# Patient Record
Sex: FEMALE | Race: WHITE | NOT HISPANIC OR LATINO | Employment: UNEMPLOYED | URBAN - METROPOLITAN AREA
[De-identification: names, ages, dates, MRNs, and addresses within clinical notes are randomized per-mention and may not be internally consistent; named-entity substitution may affect disease eponyms.]

---

## 2021-12-01 ENCOUNTER — OFFICE VISIT (OUTPATIENT)
Dept: ENDOCRINOLOGY | Facility: MEDICAL CENTER | Age: 31
End: 2021-12-01
Attending: NURSE PRACTITIONER
Payer: OTHER GOVERNMENT

## 2021-12-01 VITALS
WEIGHT: 155 LBS | HEART RATE: 95 BPM | SYSTOLIC BLOOD PRESSURE: 120 MMHG | BODY MASS INDEX: 22.96 KG/M2 | DIASTOLIC BLOOD PRESSURE: 72 MMHG | OXYGEN SATURATION: 97 % | HEIGHT: 69 IN

## 2021-12-01 DIAGNOSIS — E55.9 VITAMIN D DEFICIENCY: ICD-10-CM

## 2021-12-01 DIAGNOSIS — C73 PAPILLARY THYROID CARCINOMA (HCC): ICD-10-CM

## 2021-12-01 DIAGNOSIS — E03.9 HYPOTHYROIDISM, UNSPECIFIED TYPE: ICD-10-CM

## 2021-12-01 PROCEDURE — 99204 OFFICE O/P NEW MOD 45 MIN: CPT | Performed by: NURSE PRACTITIONER

## 2021-12-01 PROCEDURE — 99211 OFF/OP EST MAY X REQ PHY/QHP: CPT | Performed by: NURSE PRACTITIONER

## 2021-12-01 RX ORDER — LEVOTHYROXINE SODIUM 0.07 MG/1
75 TABLET ORAL
Qty: 30 TABLET | Refills: 6 | Status: SHIPPED | OUTPATIENT
Start: 2021-12-01 | End: 2022-04-05

## 2021-12-01 RX ORDER — LEVOTHYROXINE SODIUM 0.07 MG/1
75 TABLET ORAL
COMMUNITY
Start: 2021-10-07 | End: 2021-12-01 | Stop reason: SDUPTHER

## 2021-12-01 NOTE — PROGRESS NOTES
REASON FOR CONSULTATION:  Consult requested by Vincent Painting M.D. for evaluation of postsurgical hypothyroidism.    HPI:     Lina Poon is a 31 y.o. female, who had partial thyroidectomy; L thyroid lobectomy and isthmusectomy on 01/22/2021 with pathology revealing papillary thyroid cancer.  Patient was in 2nd trimester when she was given this diagnosis. She did not have problems with postoperative hypocalcemia. She wasn't subsequently treated with radioactive iodine . Thyrogen stimulation studies were last done on April 2021.  With a stimulated thyroglobulin 2.2 and whole body scan negative.  Neck ultrasound was last done on 06/2021 showing no evidence of recurrence.    FNA biopsy that was completed prior to surgery 11/17/2020: Highly suspicious for primary thyroid neoplasm; 2.9 cm nodule cluster in the left lower pole of thyroid gland.    Surgical pathology completed 01/22/2021: Lymph nodes negative for tumor, tumor size 3.2 cm, papillary carcinoma-conventional and follicular variant.  Extrathyroidal extension was absent.  Invasion of strap muscles was absent.  Invasion of subcu soft tissue, larynx, trachea, esophagus or recurrent laryngeal nerve was absent.  Angioinvasion absent.  Lymphatic invasion not identified.  Margins uninvolved.  Number of lymph nodes identified 10 and all were without metastasis.    Pathology showed low risk papillary thyroid carcinoma.  No extension to other tissues and radiation was not indicated at the time.  Also patient was pregnant during all of the invasive procedures and testing that occurred from October 2020 forward.    Most recent thyroid function completed 09/02/2021: TSH <0.01, free T4 1.25.    Levothyroxine 75mcg for over 8 months. Takes hormone 1 hours before breakfast. Only taking prenatal vitamin at this time.     Moved from [facility] in June 2021. [facility], Dr. Emely Dunham.     Follow-up neck ultrasound completed 06/22/2021: Right  thyroid lobe 1.8 x 1.7 x 4.9 cm, volume 8.2 cc.  Normal in size.  Mildly heterogeneous echotexture without hypervascularity.  No right lobe nodules.  Left lobe is surgically absent.  Isthmus is surgically absent.    She denies hoarseness or voice changes.  She denies any known neck nodules. She denies shortness of breath or cough.  She denies history of head or neck radiation other than radioactive iodine therapy following thyroidectomy.  She denies perioral or peripheral paresthesias.   Patient denies family history of thyroid disease or thyroid cancer..     Since last visit, she has remained on 75 mcg micrograms daily, which has been her dose for 4 months.  She is feeling well.  Energy level has been adequate.  Weight is stable, with weight loss directly related to postpartum status..  No palpitations, no frequent diarrhea, or frequent constipation.  No heat or cold intolerance.  No anterior neck symptoms or problems.     Patient reports adequate energy level, denies palpitation, denies dysphagia, denies anterior neck tenderness.     I have reviewed approximately 20 pages of records pertaining to her prior thyroid cancer evaluation and treatment.      ROS:      CONS:     No fever, no chills, no weight loss, no fatigue   EYES:      No diplopia, no blurry vision, no redness of eyes, no swelling of eyelids   ENT:    No hearing loss, No ear pain, No sore throat, no dysphagia, no neck swelling   CV:     No chest pain, no palpitations, no claudication, no orthopnea, no PND   PULM:    No SOB, no cough, no hemoptysis, no wheezing    GI:   No nausea, no vomiting, no diarrhea, no constipation, no bloody stools   :  Passing urine well, no dysuria, no hematuria   ENDO:   No polyuria, no polydipsia, no heat intolerance, no cold intolerance   NEURO: No headaches, no dizziness, no convulsions, no tremors   MUSC:  No joint swellings, no arthralgias, no myalgias, no weakness   SKIN:   No rash, no ulcers, no dry skin   PSYCH:    No depression, no anxiety, no difficulty sleeping       Past Medical History:  There are no problems to display for this patient.      Past Surgical History:  No past surgical history on file.     Allergies:  Patient has no known allergies.     Current Medications:    Current Outpatient Medications:   •  levothyroxine (SYNTHROID) 75 MCG Tab, Take 75 mcg by mouth every morning before breakfast., Disp: , Rfl:     Social History:  Social History     Socioeconomic History   • Marital status:      Spouse name: Not on file   • Number of children: Not on file   • Years of education: Not on file   • Highest education level: Not on file   Occupational History   • Not on file   Tobacco Use   • Smoking status: Never Smoker   • Smokeless tobacco: Never Used   Vaping Use   • Vaping Use: Never used   Substance and Sexual Activity   • Alcohol use: Not Currently   • Drug use: Never   • Sexual activity: Not on file   Other Topics Concern   • Not on file   Social History Narrative   • Not on file     Social Determinants of Health     Financial Resource Strain:    • Difficulty of Paying Living Expenses: Not on file   Food Insecurity:    • Worried About Running Out of Food in the Last Year: Not on file   • Ran Out of Food in the Last Year: Not on file   Transportation Needs:    • Lack of Transportation (Medical): Not on file   • Lack of Transportation (Non-Medical): Not on file   Physical Activity:    • Days of Exercise per Week: Not on file   • Minutes of Exercise per Session: Not on file   Stress:    • Feeling of Stress : Not on file   Social Connections:    • Frequency of Communication with Friends and Family: Not on file   • Frequency of Social Gatherings with Friends and Family: Not on file   • Attends Jainism Services: Not on file   • Active Member of Clubs or Organizations: Not on file   • Attends Club or Organization Meetings: Not on file   • Marital Status: Not on file   Intimate Partner Violence:    • Fear of Current  "or Ex-Partner: Not on file   • Emotionally Abused: Not on file   • Physically Abused: Not on file   • Sexually Abused: Not on file   Housing Stability:    • Unable to Pay for Housing in the Last Year: Not on file   • Number of Places Lived in the Last Year: Not on file   • Unstable Housing in the Last Year: Not on file        Family History:   No family history on file.      PHYSICAL EXAM:   Vital signs: /72 (BP Location: Left arm, Patient Position: Sitting, BP Cuff Size: Adult)   Pulse 95   Ht 1.753 m (5' 9\")   Wt 70.3 kg (155 lb)   SpO2 97%   BMI 22.89 kg/m²   GENERAL: Well-developed, well-nourished  in no apparent distress.   EYE: No ocular and eyelid asymmetry, Anicteric sclerae,  PERRL, No exophthalmos or lidlag  HENT: Hearing grossly intact, Normocephalic, atraumatic. Pink, moist mucous membranes, No exudate  NECK: Supple. Trachea midline.  Well-healed 2 cm mid clavicular incision.  CARDIOVASCULAR: Regular rate and rhythm. No murmurs, rubs, or gallops.   LUNGS: Clear to auscultation bilaterally   ABDOMEN: Soft, nontender with positive bowel sounds.   EXTREMITIES: No clubbing, cyanosis, or edema.   NEUROLOGICAL: Cranial nerves II-XII are grossly intact   Symmetric reflexes at the patella no proximal muscle weakness, No visible tremor with both outstretched hands  LYMPH: No cervical, supraclavicular,  adenopathy palpated.   SKIN: No rashes, lesions. Turgor is normal.    ASSESSMENT/PLAN:     1. Papillary thyroid carcinoma (HCC)  Stable.  Stimulated thyroglobulin AB at 2.2 which is within normal limits secondary to the remaining right thyroid lobe being present but the patient TSH is appropriately suppressed following the left thyroidectomy.  Recommend current thyroid function with thyroglobulin.  Ultrasound of neck was already completed postoperatively.  Recommend repeat of ultrasound in 1 year.  Patient recovered well post surgery, calcium is within normal limits and patient denied any postoperative " paresthesia or vocal cord disruption.    2. Hypothyroidism, unspecified type  Stable.  Recommend current thyroid function level to further assess upward titration of current levothyroxine dosing.    Complete labs now:  - T3 FREE; Future  - FREE THYROXINE; Future  - TSH; Future  - THYROGLOBULIN, QT; Future  - THYROID PEROXIDASE  (TPO) AB; Future  - VITAMIN D,25 HYDROXY; Future  - VITAMIN B12; Future  - Comp Metabolic Panel; Future      3. Vitamin D deficiency  Unstable.  Recommend vitamin D 5000 IU daily.    Future lab orders:  - VITAMIN D,25 HYDROXY; Future  - VITAMIN B12; Future  - Comp Metabolic Panel; Future    Complete labs 1 to 2 weeks prior to next appointment  Next appointment in 3 months.    Thank you kindly for allowing me to participate in the thyroid care plan for this patient.    Sabi Pérez, APRN  12/01/21    CC:   Vincent Painting M.D.

## 2021-12-11 ENCOUNTER — HOSPITAL ENCOUNTER (OUTPATIENT)
Dept: LAB | Facility: MEDICAL CENTER | Age: 31
End: 2021-12-11
Attending: NURSE PRACTITIONER
Payer: OTHER GOVERNMENT

## 2021-12-11 DIAGNOSIS — E55.9 VITAMIN D DEFICIENCY: ICD-10-CM

## 2021-12-11 DIAGNOSIS — E03.9 HYPOTHYROIDISM, UNSPECIFIED TYPE: ICD-10-CM

## 2021-12-11 LAB
ALBUMIN SERPL BCP-MCNC: 5 G/DL (ref 3.2–4.9)
ALBUMIN/GLOB SERPL: 1.7 G/DL
ALP SERPL-CCNC: 90 U/L (ref 30–99)
ALT SERPL-CCNC: 9 U/L (ref 2–50)
ANION GAP SERPL CALC-SCNC: 10 MMOL/L (ref 7–16)
AST SERPL-CCNC: 14 U/L (ref 12–45)
BILIRUB SERPL-MCNC: 1.6 MG/DL (ref 0.1–1.5)
BUN SERPL-MCNC: 11 MG/DL (ref 8–22)
CALCIUM SERPL-MCNC: 9.9 MG/DL (ref 8.5–10.5)
CHLORIDE SERPL-SCNC: 104 MMOL/L (ref 96–112)
CO2 SERPL-SCNC: 25 MMOL/L (ref 20–33)
CREAT SERPL-MCNC: 0.8 MG/DL (ref 0.5–1.4)
FASTING STATUS PATIENT QL REPORTED: NORMAL
GLOBULIN SER CALC-MCNC: 2.9 G/DL (ref 1.9–3.5)
GLUCOSE SERPL-MCNC: 86 MG/DL (ref 65–99)
POTASSIUM SERPL-SCNC: 4.2 MMOL/L (ref 3.6–5.5)
PROT SERPL-MCNC: 7.9 G/DL (ref 6–8.2)
SODIUM SERPL-SCNC: 139 MMOL/L (ref 135–145)
T3FREE SERPL-MCNC: 2.31 PG/ML (ref 2–4.4)
T4 FREE SERPL-MCNC: 1.38 NG/DL (ref 0.93–1.7)
THYROPEROXIDASE AB SERPL-ACNC: >600 IU/ML (ref 0–9)
TSH SERPL DL<=0.005 MIU/L-ACNC: 12.7 UIU/ML (ref 0.38–5.33)
VIT B12 SERPL-MCNC: 609 PG/ML (ref 211–911)

## 2021-12-11 PROCEDURE — 84443 ASSAY THYROID STIM HORMONE: CPT

## 2021-12-11 PROCEDURE — 86800 THYROGLOBULIN ANTIBODY: CPT

## 2021-12-11 PROCEDURE — 82607 VITAMIN B-12: CPT

## 2021-12-11 PROCEDURE — 86376 MICROSOMAL ANTIBODY EACH: CPT

## 2021-12-11 PROCEDURE — 82306 VITAMIN D 25 HYDROXY: CPT

## 2021-12-11 PROCEDURE — 84481 FREE ASSAY (FT-3): CPT

## 2021-12-11 PROCEDURE — 84439 ASSAY OF FREE THYROXINE: CPT

## 2021-12-11 PROCEDURE — 84432 ASSAY OF THYROGLOBULIN: CPT

## 2021-12-11 PROCEDURE — 80053 COMPREHEN METABOLIC PANEL: CPT

## 2021-12-11 PROCEDURE — 36415 COLL VENOUS BLD VENIPUNCTURE: CPT

## 2021-12-13 DIAGNOSIS — E03.9 HYPOTHYROIDISM, UNSPECIFIED TYPE: ICD-10-CM

## 2021-12-13 RX ORDER — LEVOTHYROXINE SODIUM 0.1 MG/1
100 TABLET ORAL
Qty: 90 TABLET | Refills: 2 | Status: SHIPPED | OUTPATIENT
Start: 2021-12-13 | End: 2022-08-08

## 2021-12-15 LAB — 25(OH)D3 SERPL-MCNC: 41 NG/ML (ref 30–80)

## 2021-12-20 LAB
THYROGLOB AB SERPL-ACNC: 5.9 IU/ML (ref 0–4)
THYROGLOB SERPL-MCNC: 1.8 NG/ML (ref 1.3–31.8)
THYROGLOB SERPL-MCNC: ABNORMAL NG/ML (ref 1.3–31.8)

## 2022-03-29 ENCOUNTER — HOSPITAL ENCOUNTER (OUTPATIENT)
Dept: LAB | Facility: MEDICAL CENTER | Age: 32
End: 2022-03-29
Attending: NURSE PRACTITIONER
Payer: OTHER GOVERNMENT

## 2022-03-29 DIAGNOSIS — E03.9 HYPOTHYROIDISM, UNSPECIFIED TYPE: ICD-10-CM

## 2022-03-29 PROCEDURE — 84481 FREE ASSAY (FT-3): CPT

## 2022-03-29 PROCEDURE — 36415 COLL VENOUS BLD VENIPUNCTURE: CPT

## 2022-03-29 PROCEDURE — 84443 ASSAY THYROID STIM HORMONE: CPT

## 2022-03-29 PROCEDURE — 84439 ASSAY OF FREE THYROXINE: CPT

## 2022-03-30 LAB
T3FREE SERPL-MCNC: 2.96 PG/ML (ref 2–4.4)
T4 FREE SERPL-MCNC: 1.72 NG/DL (ref 0.93–1.7)
TSH SERPL DL<=0.005 MIU/L-ACNC: 10.4 UIU/ML (ref 0.38–5.33)

## 2022-04-05 ENCOUNTER — OFFICE VISIT (OUTPATIENT)
Dept: ENDOCRINOLOGY | Facility: MEDICAL CENTER | Age: 32
End: 2022-04-05
Attending: NURSE PRACTITIONER
Payer: OTHER GOVERNMENT

## 2022-04-05 VITALS
SYSTOLIC BLOOD PRESSURE: 110 MMHG | DIASTOLIC BLOOD PRESSURE: 72 MMHG | BODY MASS INDEX: 22.51 KG/M2 | WEIGHT: 152 LBS | HEIGHT: 69 IN | OXYGEN SATURATION: 97 % | HEART RATE: 74 BPM

## 2022-04-05 DIAGNOSIS — C73 PAPILLARY THYROID CARCINOMA (HCC): ICD-10-CM

## 2022-04-05 DIAGNOSIS — E89.0 POSTOPERATIVE HYPOTHYROIDISM: ICD-10-CM

## 2022-04-05 DIAGNOSIS — E06.3 HASHIMOTO'S DISEASE: ICD-10-CM

## 2022-04-05 DIAGNOSIS — E55.9 VITAMIN D DEFICIENCY: ICD-10-CM

## 2022-04-05 PROCEDURE — 99211 OFF/OP EST MAY X REQ PHY/QHP: CPT | Performed by: NURSE PRACTITIONER

## 2022-04-05 PROCEDURE — 99214 OFFICE O/P EST MOD 30 MIN: CPT | Performed by: NURSE PRACTITIONER

## 2022-04-05 RX ORDER — LEVOTHYROXINE SODIUM 0.15 MG/1
150 TABLET ORAL
Qty: 90 TABLET | Refills: 3 | Status: SHIPPED | OUTPATIENT
Start: 2022-04-05 | End: 2022-08-08

## 2022-04-05 NOTE — PROGRESS NOTES
REASON FOR CONSULTATION:  Follow up for evaluation of postsurgical hypothyroidism.    HPI:     Lina Poon is a 31 y.o. female, who had partial thyroidectomy; L thyroid lobectomy and isthmusectomy on 01/22/2021 with pathology revealing papillary thyroid cancer.  Patient was in 2nd trimester of pregnancy when she was given this diagnosis. She did not have problems with postoperative hypocalcemia. She wasn't subsequently treated with radioactive iodine. Thyrogen stimulation studies were last done on April 2021.  With a stimulated thyroglobulin 2.2 and whole body scan negative.  Neck ultrasound was last done on 06/2021 showing no evidence of recurrence.    Moved from [facility] in June 2021. [facility], Dr. Emely Dunham with her treating physician.    FNA biopsy that was completed prior to surgery 11/17/2020: Highly suspicious for primary thyroid neoplasm; 2.9 cm nodule cluster in the left lower pole of thyroid gland.    Surgical pathology completed 01/22/2021: Lymph nodes negative for tumor, tumor size 3.2 cm, papillary carcinoma-conventional and follicular variant.  Extrathyroidal extension was absent.  Invasion of strap muscles was absent.  Invasion of subcu soft tissue, larynx, trachea, esophagus or recurrent laryngeal nerve was absent.  Angioinvasion absent.  Lymphatic invasion not identified.  Margins uninvolved.  Number of lymph nodes identified 10 and all were without metastasis.    Pathology showed low risk papillary thyroid carcinoma.  No extension to other tissues and radiation was not indicated at the time.  Also patient was pregnant during all of the invasive procedures and testing that occurred from October 2020 forward.    Most recent thyroid function completed 09/02/2021: TSH <0.01, free T4 1.25.    After initial consultation levothyroxine was increased to 100 mcg daily.  Patient has been taking the medication routinely on an empty stomach 30 to 40 minutes prior to  breakfast every morning without issue.  Patient does report fatigue however she is caring full-time for her infant daughter.  Patient denies cold intolerance or constipation.  Patient denies taking calcium, antacids and/or iron supplementation.  Only supplementation she is currently taking is a prenatal vitamin.    Follow-up neck ultrasound completed 06/22/2021: Right thyroid lobe 1.8 x 1.7 x 4.9 cm, volume 8.2 cc.  Normal in size.  Mildly heterogeneous echotexture without hypervascularity.  No right lobe nodules.  Left lobe is surgically absent.  Isthmus is surgically absent.    She denies hoarseness or voice changes.  She denies any known neck nodules. She denies shortness of breath or cough.  She denies history of head or neck radiation other than radioactive iodine therapy following thyroidectomy.  She denies perioral or peripheral paresthesias.       Weight is unchanged from last appointment.    Unfortunately TSH level remains elevated at 10.4 and free T4 slightly elevated although this is not an accurate measure.    TPO antibodies are positive.     Ref. Range 12/11/2021 07:30   Microsomal -Tpo- Abs Latest Ref Range: 0.0 - 9.0 IU/mL >600.0 (H)        Ref. Range 3/29/2022 07:31   TSH Latest Ref Range: 0.380 - 5.330 uIU/mL 10.400 (H)   Free T-4 Latest Ref Range: 0.93 - 1.70 ng/dL 1.72 (H)   T3,Free Latest Ref Range: 2.00 - 4.40 pg/mL 2.96       Patient reports adequate energy level, denies palpitation, denies dysphagia, denies anterior neck tenderness.           ROS:      CONS:     No fever, no chills, no weight loss, no fatigue   EYES:      No diplopia, no blurry vision, no redness of eyes, no swelling of eyelids   ENT:    No hearing loss, No ear pain, No sore throat, no dysphagia, no neck swelling   CV:     No chest pain, no palpitations, no claudication, no orthopnea, no PND   PULM:    No SOB, no cough, no hemoptysis, no wheezing    GI:   No nausea, no vomiting, no diarrhea, no constipation, no bloody stools    :  Passing urine well, no dysuria, no hematuria   ENDO:   No polyuria, no polydipsia, no heat intolerance, no cold intolerance   NEURO: No headaches, no dizziness, no convulsions, no tremors   MUSC:  No joint swellings, no arthralgias, no myalgias, no weakness   SKIN:   No rash, no ulcers, no dry skin   PSYCH:   No depression, no anxiety, no difficulty sleeping       Past Medical History:  There are no problems to display for this patient.      Past Surgical History:  History reviewed. No pertinent surgical history.     Allergies:  Patient has no known allergies.     Current Medications:    Current Outpatient Medications:   •  levothyroxine (SYNTHROID) 100 MCG Tab, Take 1 Tablet by mouth every morning on an empty stomach., Disp: 90 Tablet, Rfl: 2  •  levothyroxine (SYNTHROID) 75 MCG Tab, Take 1 Tablet by mouth every morning before breakfast., Disp: 30 Tablet, Rfl: 6    Social History:  Social History     Socioeconomic History   • Marital status:      Spouse name: Not on file   • Number of children: Not on file   • Years of education: Not on file   • Highest education level: Not on file   Occupational History   • Not on file   Tobacco Use   • Smoking status: Never Smoker   • Smokeless tobacco: Never Used   Vaping Use   • Vaping Use: Never used   Substance and Sexual Activity   • Alcohol use: Not Currently   • Drug use: Never   • Sexual activity: Not on file   Other Topics Concern   • Not on file   Social History Narrative   • Not on file     Social Determinants of Health     Financial Resource Strain: Not on file   Food Insecurity: Not on file   Transportation Needs: Not on file   Physical Activity: Not on file   Stress: Not on file   Social Connections: Not on file   Intimate Partner Violence: Not on file   Housing Stability: Not on file        Family History:   History reviewed. No pertinent family history.      PHYSICAL EXAM:   Vital signs: /72 (BP Location: Left arm, Patient Position: Sitting, BP  "Cuff Size: Adult)   Pulse 74   Ht 1.753 m (5' 9\")   Wt 68.9 kg (152 lb)   SpO2 97%   BMI 22.45 kg/m²   GENERAL: Well-developed, well-nourished  in no apparent distress.   EYE: No ocular and eyelid asymmetry, Anicteric sclerae,  PERRL, No exophthalmos or lidlag  HENT: Hearing grossly intact, Normocephalic, atraumatic. Pink, moist mucous membranes, No exudate  NECK: Supple. Trachea midline.  Well-healed 2 cm mid clavicular incision.  CARDIOVASCULAR: Regular rate and rhythm. No murmurs, rubs, or gallops.   LUNGS: Clear to auscultation bilaterally   ABDOMEN: Soft, nontender with positive bowel sounds.   EXTREMITIES: No clubbing, cyanosis, or edema.   NEUROLOGICAL: Cranial nerves II-XII are grossly intact   Symmetric reflexes at the patella no proximal muscle weakness, No visible tremor with both outstretched hands  LYMPH: No cervical, supraclavicular,  adenopathy palpated.   SKIN: No rashes, lesions. Turgor is normal.    ASSESSMENT/PLAN:     1. Papillary thyroid carcinoma (HCC)  Stable.  Stimulated thyroglobulin AB at 2.2 which is within normal limits secondary to the remaining right thyroid lobe being present but the patient TSH is appropriately suppressed following the left thyroidectomy.  Recommend current thyroid function with thyroglobulin.  Ultrasound of neck was already completed postoperatively.  Recommend repeat of ultrasound  next 5 months.      2. Hypothyroidism, unspecified type  Unstable.  Recommend increasing levothyroxine to 150 mcg daily.    Repeat labs in 6 weeks and notify provider when this has been completed.        3.  Hashimoto's disease  Another etiology of hypothyroid related condition.  TPO antibodies greater than 600 at this time.  Does not change course of treatment.      4. Vitamin D deficiency  Unstable.  Recommend vitamin D 3000 to 4000IU daily.      Complete labs in 6 weeks notify provider when this is completed.    Next appointment in 3 months with Dr German.     Thank you kindly " for allowing me to participate in the thyroid care plan for this patient.    Sabi Pérez, APRN  04/05/2022    CC:   Vincent Painting M.D.

## 2022-04-29 ENCOUNTER — HOSPITAL ENCOUNTER (OUTPATIENT)
Dept: LAB | Facility: MEDICAL CENTER | Age: 32
End: 2022-04-29
Attending: NURSE PRACTITIONER
Payer: OTHER GOVERNMENT

## 2022-04-29 DIAGNOSIS — E89.0 POSTOPERATIVE HYPOTHYROIDISM: ICD-10-CM

## 2022-04-29 LAB
T3FREE SERPL-MCNC: 3.34 PG/ML (ref 2–4.4)
T4 FREE SERPL-MCNC: 2.25 NG/DL (ref 0.93–1.7)
TSH SERPL DL<=0.005 MIU/L-ACNC: 0.22 UIU/ML (ref 0.38–5.33)

## 2022-04-29 PROCEDURE — 84481 FREE ASSAY (FT-3): CPT

## 2022-04-29 PROCEDURE — 86800 THYROGLOBULIN ANTIBODY: CPT

## 2022-04-29 PROCEDURE — 36415 COLL VENOUS BLD VENIPUNCTURE: CPT

## 2022-04-29 PROCEDURE — 84439 ASSAY OF FREE THYROXINE: CPT

## 2022-04-29 PROCEDURE — 84432 ASSAY OF THYROGLOBULIN: CPT

## 2022-04-29 PROCEDURE — 84443 ASSAY THYROID STIM HORMONE: CPT

## 2022-05-02 LAB
THYROGLOB AB SERPL-ACNC: 1.7 IU/ML (ref 0–4)
THYROGLOB SERPL-MCNC: 0.8 NG/ML (ref 1.3–31.8)
THYROGLOB SERPL-MCNC: ABNORMAL NG/ML (ref 1.3–31.8)

## 2022-05-09 DIAGNOSIS — C73 PAPILLARY THYROID CARCINOMA (HCC): ICD-10-CM

## 2022-05-09 DIAGNOSIS — E89.0 POSTOPERATIVE HYPOTHYROIDISM: ICD-10-CM

## 2022-06-07 ENCOUNTER — HOSPITAL ENCOUNTER (OUTPATIENT)
Dept: RADIOLOGY | Facility: MEDICAL CENTER | Age: 32
End: 2022-06-07
Attending: NURSE PRACTITIONER
Payer: OTHER GOVERNMENT

## 2022-06-07 DIAGNOSIS — C73 PAPILLARY THYROID CARCINOMA (HCC): ICD-10-CM

## 2022-06-07 PROCEDURE — 76536 US EXAM OF HEAD AND NECK: CPT

## 2022-08-08 ENCOUNTER — OFFICE VISIT (OUTPATIENT)
Dept: ENDOCRINOLOGY | Facility: MEDICAL CENTER | Age: 32
End: 2022-08-08
Attending: INTERNAL MEDICINE
Payer: OTHER GOVERNMENT

## 2022-08-08 VITALS
SYSTOLIC BLOOD PRESSURE: 118 MMHG | HEIGHT: 69 IN | WEIGHT: 154 LBS | OXYGEN SATURATION: 96 % | HEART RATE: 94 BPM | DIASTOLIC BLOOD PRESSURE: 80 MMHG | BODY MASS INDEX: 22.81 KG/M2

## 2022-08-08 DIAGNOSIS — E55.9 VITAMIN D DEFICIENCY: ICD-10-CM

## 2022-08-08 DIAGNOSIS — E06.3 HASHIMOTO'S DISEASE: ICD-10-CM

## 2022-08-08 DIAGNOSIS — E89.0 POSTOPERATIVE HYPOTHYROIDISM: ICD-10-CM

## 2022-08-08 DIAGNOSIS — C73 PAPILLARY THYROID CARCINOMA (HCC): ICD-10-CM

## 2022-08-08 PROCEDURE — 99211 OFF/OP EST MAY X REQ PHY/QHP: CPT | Performed by: INTERNAL MEDICINE

## 2022-08-08 PROCEDURE — 99214 OFFICE O/P EST MOD 30 MIN: CPT | Performed by: INTERNAL MEDICINE

## 2022-08-08 RX ORDER — LEVOTHYROXINE SODIUM 137 UG/1
137 TABLET ORAL
Qty: 90 TABLET | Refills: 2 | Status: SHIPPED | OUTPATIENT
Start: 2022-08-08 | End: 2023-02-22

## 2022-08-08 ASSESSMENT — PATIENT HEALTH QUESTIONNAIRE - PHQ9: CLINICAL INTERPRETATION OF PHQ2 SCORE: 0

## 2022-08-08 NOTE — PROGRESS NOTES
CHIEF COMPLAINT: Followup of postsurgical hypothyroidism and papillary thyroid carcinoma.    HPI:   Lina Poon is a 31 y.o. female, who had an initial left hemithyroidectomy with isthmusectomy on January 22, 2021 in [place] with pathology revealing papillary thyroid cancer.  She was diagnosed with a suspicious thyroid nodule on the left lobe while the second trimester pregnancy.  Pathology revealed 3.2 cm classic type and follicular variant of papillary thyroid carcinoma with no extrathyroidal extension and no lymphatic or vascular invasion.  10 out of 10 lymph nodes from the central neck compartment showed no evidence of metastasis.  Because her thyroid cancer was low risk he was not treated with radioactive iodine.  Incidentally she does have Hashimoto's thyroiditis and her TPO antibodies are over 600 at baseline.    She was previously seen by the nurse practitioner and this is my first time meeting her.    Her last unstimulated quantitative thyroglobulin was 0.8 with negative interfering antibodies on April 29, 2022    Her last neck ultrasound on July 7, 2022 showed no evidence of recurrence with a heterogenous right lobe with no focal or suspicious thyroid nodules.      Currently she remains on levothyroxine 150 MCG daily which has been her dose since March 2022 after discovering that her TSH was elevated at 10 while on levothyroxine 100 MCG daily.  She reports good compliance and denies missing any daily doses.  She does report occasional anxiety and occasional tremors of this particular dose.  She denies heat intolerance or palpitations.      Her last TSH was suppressed at 0.22 with a frankly elevated free T4 of 2.25 on April 29, 2022      Patient's medications, allergies, and social histories were reviewed and updated as appropriate.      ROS:      CONS:     No fever, no chills   EYES:     No diplopia, no blurry vision   CV:           No chest pain, no palpitations   PULM:     No SOB, no cough,  "no hemoptysis.   GI:            No nausea, no vomiting, no diarrhea, no constipation   ENDO:     No polyuria, no polydipsia, no heat intolerance, no cold intolerance       Past Medical History:  Problem List:  2022-08: Papillary thyroid carcinoma (HCC)  2022-08: Postoperative hypothyroidism  2022-08: Vitamin D deficiency      Past Surgical History:  No past surgical history on file.     Allergies:  Patient has no known allergies.     Social History:  Social History     Tobacco Use   • Smoking status: Never Smoker   • Smokeless tobacco: Never Used   Vaping Use   • Vaping Use: Never used   Substance Use Topics   • Alcohol use: Yes     Comment: occasional   • Drug use: Never        Family History:   family history is not on file.      PHYSICAL EXAM:   Vital signs: /80 (BP Location: Left arm, Patient Position: Sitting, BP Cuff Size: Adult)   Pulse 94   Ht 1.753 m (5' 9\")   Wt 69.9 kg (154 lb)   SpO2 96%   Breastfeeding No   BMI 22.74 kg/m²   GENERAL: Well-developed, well-nourished in no apparent distress.   EYE:  No ocular asymmetry, PERRLA  HENT: Pink, moist mucous membranes.    NECK: Well healed transverse scar, Thyroid is surgically absent   CARDIOVASCULAR:  No murmurs  LUNGS: Clear breath sounds  ABDOMEN: Soft, nontender   EXTREMITIES: No clubbing, cyanosis, or edema.   NEUROLOGICAL: No gross focal motor abnormalities   LYMPH: No cervical adenopathy palpated.   SKIN: No rashes, lesions.       Labs:  No results found for: WBC, RBC, HEMOGLOBIN, MCV, MCH, MCHC, RDW, MPV    Lab Results   Component Value Date/Time    SODIUM 139 12/11/2021 07:30 AM    POTASSIUM 4.2 12/11/2021 07:30 AM    CHLORIDE 104 12/11/2021 07:30 AM    CO2 25 12/11/2021 07:30 AM    ANION 10.0 12/11/2021 07:30 AM    GLUCOSE 86 12/11/2021 07:30 AM    BUN 11 12/11/2021 07:30 AM    CREATININE 0.80 12/11/2021 07:30 AM    CALCIUM 9.9 12/11/2021 07:30 AM    ASTSGOT 14 12/11/2021 07:30 AM    ALTSGPT 9 12/11/2021 07:30 AM    TBILIRUBIN 1.6 (H) " 12/11/2021 07:30 AM    ALBUMIN 5.0 (H) 12/11/2021 07:30 AM    TOTPROTEIN 7.9 12/11/2021 07:30 AM    GLOBULIN 2.9 12/11/2021 07:30 AM    AGRATIO 1.7 12/11/2021 07:30 AM       Lab Results   Component Value Date/Time    TSHULTRASEN 0.220 (L) 04/29/2022 1343     Lab Results   Component Value Date/Time    FREET4 2.25 (H) 04/29/2022 1343     Lab Results   Component Value Date/Time    FREET3 3.34 04/29/2022 1343     No results found for: THYSTIMIG      Imaging:  US-THYROID  Narrative: 6/7/2022 8:29 AM    HISTORY/REASON FOR EXAM:  Papillary CA status post left hemithyroidectomy    TECHNIQUE/EXAM DESCRIPTION:  Ultrasound of the soft tissues of the head and neck.    COMPARISON:  None    FINDINGS:  The thyroid gland is heterogeneous.  Vascularity is normal.    The right lobe of the thyroid gland measures 1.59 cm x 4.98 cm x 1.70 cm. The contour is normal. No focal mass lesions are identified. The right lobe is heterogeneous in echotexture.    The left lower lobe of the thyroid gland is surgically absent. No evidence of focal recurrence noted No focal mass lesions are identified.    The isthmus measures 0.43 cm.    Lymph nodes are noted bilaterally with no evidence of hilar effacement asymmetric cortical thickening and cystic artery calcifications noted.  Impression: Personal history of papillary CA left thyroid status post left thyroid lobectomy.  The thyroid is very heterogeneous in echotexture which may represent changes of Hashimoto's thyroiditis. No abnormal lymphadenopathy or evidence for recurrence        ASSESSMENT/PLAN:     1. Papillary thyroid carcinoma (HCC)  Stable  No evidence of recurrence  Continue monitoring quantitative thyroglobulin every 6 to 12 months  Continue monitoring neck ultrasound every 12 months    2. Postoperative hypothyroidism  Unstable  TSH is low free T4 is elevated  Adjust levothyroxine to 137 MCG daily  Repeat thyroid labs again in 3 months  I will contact her  Follow-up in 6 months with  repeat thyroid labs    3. Vitamin D deficiency  Stable  Continue monitor    4. Hashimoto's disease  This is a symptom background of her thyroid cancer  Her right lobe that is intact shows heterogenous changes compatible with autoimmune thyroid disease or Hashimoto's  Recommend observation as she is already receiving appropriate treatment without her replacement therapy      Return in about 6 months (around 2/8/2023).      This patient during there office visit today was started on a new medication.  Side effects of the new medication were discussed with the patient today in the office.     Thank you kindly for allowing me to participate in the thyroid care plan for this patient.    Chacorta German MD, [facility], [facility]  08/08/22    CC:   Vincent Painting M.D.

## 2022-12-15 ENCOUNTER — OFFICE VISIT (OUTPATIENT)
Dept: URGENT CARE | Facility: PHYSICIAN GROUP | Age: 32
End: 2022-12-15
Payer: OTHER GOVERNMENT

## 2022-12-15 ENCOUNTER — HOSPITAL ENCOUNTER (OUTPATIENT)
Facility: MEDICAL CENTER | Age: 32
End: 2022-12-15
Attending: STUDENT IN AN ORGANIZED HEALTH CARE EDUCATION/TRAINING PROGRAM
Payer: OTHER GOVERNMENT

## 2022-12-15 VITALS
DIASTOLIC BLOOD PRESSURE: 64 MMHG | WEIGHT: 162 LBS | TEMPERATURE: 97.3 F | HEIGHT: 69 IN | RESPIRATION RATE: 18 BRPM | OXYGEN SATURATION: 100 % | HEART RATE: 75 BPM | BODY MASS INDEX: 23.99 KG/M2 | SYSTOLIC BLOOD PRESSURE: 136 MMHG

## 2022-12-15 DIAGNOSIS — J10.1 INFLUENZA A: ICD-10-CM

## 2022-12-15 PROCEDURE — 99213 OFFICE O/P EST LOW 20 MIN: CPT | Performed by: STUDENT IN AN ORGANIZED HEALTH CARE EDUCATION/TRAINING PROGRAM

## 2022-12-15 PROCEDURE — 0240U HCHG SARS-COV-2 COVID-19 NFCT DS RESP RNA 3 TRGT MIC: CPT

## 2022-12-15 RX ORDER — OSELTAMIVIR PHOSPHATE 75 MG/1
75 CAPSULE ORAL 2 TIMES DAILY
Qty: 10 CAPSULE | Refills: 0 | Status: SHIPPED | OUTPATIENT
Start: 2022-12-15 | End: 2022-12-20

## 2022-12-15 NOTE — PROGRESS NOTES
"Subjective:   Lina Poon is a 32 y.o. female who presents for Cough, Body Aches, Fever, and Headache (Daughter test POS for FLU A yesterday. )      HPI:  Pleasant 32-year-old female presents urgent care for 1 day of body aches, dry cough, chills, and headache.  Patient's daughter tested positive for influenza A yesterday.  Her symptoms started shortly after her daughter is having the same.  She is able to maintain adequate intake of fluids and solids.  Using OTC cold medication.  Denies fever today, ear pain, ear discharge, sore throat, eye discharge, eye redness, chest pain, palpitations, sputum production, shortness of breath, nausea, vomiting, dizziness, or abdominal pain.      Medications:    levothyroxine Tabs  oseltamivir Caps    Allergies: Patient has no known allergies.    Problem List: Lina Poon does not have any pertinent problems on file.    Surgical History:  No past surgical history on file.    Past Social Hx: Lina Poon  reports that she has never smoked. She has never used smokeless tobacco. She reports current alcohol use. She reports that she does not use drugs.     Past Family Hx:  Lina Poon family history is not on file.     Problem list, medications, and allergies reviewed by myself today in Epic.     Objective:     /64   Pulse 75   Temp 36.3 °C (97.3 °F) (Temporal)   Resp 18   Ht 1.753 m (5' 9\")   Wt 73.5 kg (162 lb)   SpO2 100%   BMI 23.92 kg/m²     Physical Exam  Vitals reviewed.   Constitutional:       General: She is not in acute distress.     Appearance: Normal appearance.   HENT:      Head: Normocephalic.      Right Ear: Tympanic membrane, ear canal and external ear normal.      Left Ear: Tympanic membrane, ear canal and external ear normal.      Nose: Nose normal. No congestion or rhinorrhea.      Mouth/Throat:      Mouth: Mucous membranes are moist.      Pharynx: No oropharyngeal exudate or posterior oropharyngeal " erythema.   Eyes:      Conjunctiva/sclera: Conjunctivae normal.      Pupils: Pupils are equal, round, and reactive to light.   Cardiovascular:      Rate and Rhythm: Normal rate and regular rhythm.      Pulses: Normal pulses.      Heart sounds: Normal heart sounds. No murmur heard.  Pulmonary:      Effort: Pulmonary effort is normal. No respiratory distress.      Breath sounds: Normal breath sounds. No stridor. No wheezing, rhonchi or rales.   Musculoskeletal:      Cervical back: Normal range of motion.   Lymphadenopathy:      Cervical: No cervical adenopathy.   Skin:     General: Skin is warm and dry.      Capillary Refill: Capillary refill takes less than 2 seconds.      Findings: No erythema, lesion or rash.   Neurological:      General: No focal deficit present.      Mental Status: She is alert and oriented to person, place, and time.       Assessment/Plan:     Diagnosis and associated orders:     1. Influenza A  Influenza A/B By PCR (Adult - Flu Only)    oseltamivir (TAMIFLU) 75 MG Cap         Comments/MDM:     Patient's presentation physical exam findings are clinically consistent with influenza A.  She does have positive exposure with her daughter who started having symptoms at a similar time.  Her daughter did test positive for influenza A.  Discussed with patient that unfortunately we do not have rapid testing currently in clinic due to being outside of his swab.  We will do a PCR test for further evaluation.  Due to her positive exposure and clinical symptoms consistent with influenza we will treat with Tamiflu so that she is still in the timeframe.  Unfortunately PCR testing would come back to late to start this medication at that time.  ED/return precautions were given.  Vitals all within normal limits and she is currently afebrile.  OTC medicine as needed.         Differential diagnosis, natural history, supportive care, and indications for immediate follow-up discussed.    Advised the patient to  follow-up with the primary care physician for recheck, reevaluation, and consideration of further management.    Please note that this dictation was created using voice recognition software. I have made a reasonable attempt to correct obvious errors, but I expect that there are errors of grammar and possibly content that I did not discover before finalizing the note.    Electronically signed by Gentry Lane PA-C.

## 2022-12-16 DIAGNOSIS — J10.1 INFLUENZA A: ICD-10-CM

## 2022-12-17 LAB
FLUAV RNA SPEC QL NAA+PROBE: POSITIVE
FLUBV RNA SPEC QL NAA+PROBE: NEGATIVE
SARS-COV-2 RNA RESP QL NAA+PROBE: NOTDETECTED
SPECIMEN SOURCE: ABNORMAL

## 2023-01-05 ENCOUNTER — GYNECOLOGY VISIT (OUTPATIENT)
Dept: OBGYN | Facility: CLINIC | Age: 33
End: 2023-01-05
Payer: OTHER GOVERNMENT

## 2023-01-05 VITALS — WEIGHT: 158.7 LBS | SYSTOLIC BLOOD PRESSURE: 130 MMHG | BODY MASS INDEX: 23.44 KG/M2 | DIASTOLIC BLOOD PRESSURE: 86 MMHG

## 2023-01-05 DIAGNOSIS — N91.2 AMENORRHEA: Primary | ICD-10-CM

## 2023-01-05 DIAGNOSIS — E89.0 POSTOPERATIVE HYPOTHYROIDISM: ICD-10-CM

## 2023-01-05 DIAGNOSIS — Z32.01 PREGNANCY TEST POSITIVE: ICD-10-CM

## 2023-01-05 DIAGNOSIS — Z98.891 HISTORY OF CESAREAN DELIVERY: ICD-10-CM

## 2023-01-05 PROBLEM — E06.3 HASHIMOTO'S DISEASE: Status: RESOLVED | Noted: 2022-08-08 | Resolved: 2023-01-05

## 2023-01-05 PROCEDURE — 99202 OFFICE O/P NEW SF 15 MIN: CPT | Mod: 25 | Performed by: OBSTETRICS & GYNECOLOGY

## 2023-01-05 PROCEDURE — 76856 US EXAM PELVIC COMPLETE: CPT | Performed by: OBSTETRICS & GYNECOLOGY

## 2023-01-05 NOTE — PROGRESS NOTES
CC: Missed menses    Lina Poon is a 32 y.o.  who presents presents due to missed menses. Patient's last menstrual period was 10/20/2022 (exact date)..  She is sure of her LMP.  Based on LMP, she is 11w0d today. She was not using anything for birthcontrol.  This is was a planned pregnancy.  She reports nausea, reports vomiting, denies vaginal bleeding/spotting, and denies cramping.        OB History:    OB History    Para Term  AB Living   2 1 1     1   SAB IAB Ectopic Molar Multiple Live Births             1      # Outcome Date GA Lbr Ruslan/2nd Weight Sex Delivery Anes PTL Lv   2 Current            1 Term 05/10/21 37w0d  7 lb 1 oz F CS-LTranv Spinal N CHELSEY      Complications: Placenta Previa           Objective:   Vitals:  /86 (BP Location: Right arm, Patient Position: Sitting, BP Cuff Size: Adult)   Wt 158 lb 11.2 oz   Body mass index is 23.44 kg/m². (Goal BM I>18 <25)  Exam:  General: is in no apparent distress  Psychiatric: appropriate affect, alert and oriented x3, intact judgment and insight.  Respiratory: normal effort  Female GYN: normal female external genitalia without lesions      Procedure:  Abdominal US performed by me and per my read:    Indication: Amenorrhea, +UPT.     Findings:   Gilman intrauterine pregnancy @ 11w5d by CRL.   Positive fetal cardiac activity    Right ovary not visualized.   Left Ovary not visualized.   No free fluid in the cul-de-sac.    Impression:   Viable IUP @ 11w5d.  KAILYN by US of 23.  KAILYN by LMP: 23  Final KAILYN: 23      A/P:   32 y.o.  here for missed menses.  1. Amenorrhea  - POCT Pregnancy  2. Pregnancy test positive   - US today is c/w LMP. Final KAILYN of 23.     - Normal pregnancy s/sx discussed   - Advised prenatal vitamins, healthy well rounded diet, adequate hydration, and continued exercise.     - Labs:     - PNL ordered     - Counseled on aneuploidy and carrier screening tests:   She currently declined  aneuploidy screenings and declined carrier screening   - SAB precautions discussed.   - Discussed the size of our practice and that she will see multiple providers during the course of her care. She expresses understanding.   - PREG CNTR PRENATAL PN; Future  - Chlamydia/GC, PCR (Urine); Future    3. History of  delivery   - desires repeat    4. Postoperative hypothyroidism   - on synthroid 137mcg daily.  TSH ordered. Check q trimester    - TSH WITH REFLEX TO FT4; Future      Follow up in 4 weeks for new OB visit.    Total Time: 20 minutes spent in chart review, exam, counseling and documention      Marilu Peterson D.O.

## 2023-01-16 ENCOUNTER — HOSPITAL ENCOUNTER (OUTPATIENT)
Dept: LAB | Facility: MEDICAL CENTER | Age: 33
End: 2023-01-16
Attending: INTERNAL MEDICINE
Payer: OTHER GOVERNMENT

## 2023-01-16 ENCOUNTER — HOSPITAL ENCOUNTER (OUTPATIENT)
Dept: LAB | Facility: MEDICAL CENTER | Age: 33
End: 2023-01-16
Attending: OBSTETRICS & GYNECOLOGY
Payer: OTHER GOVERNMENT

## 2023-01-16 DIAGNOSIS — E89.0 POSTOPERATIVE HYPOTHYROIDISM: ICD-10-CM

## 2023-01-16 DIAGNOSIS — Z32.01 PREGNANCY TEST POSITIVE: ICD-10-CM

## 2023-01-16 LAB
ABO GROUP BLD: NORMAL
BLD GP AB SCN SERPL QL: NORMAL
C TRACH DNA SPEC QL NAA+PROBE: NEGATIVE
ERYTHROCYTE [DISTWIDTH] IN BLOOD BY AUTOMATED COUNT: 39.1 FL (ref 35.9–50)
HBV SURFACE AG SER QL: ABNORMAL
HCT VFR BLD AUTO: 41.8 % (ref 37–47)
HCV AB SER QL: ABNORMAL
HGB BLD-MCNC: 14.8 G/DL (ref 12–16)
HIV 1+2 AB+HIV1 P24 AG SERPL QL IA: NORMAL
MCH RBC QN AUTO: 32.6 PG (ref 27–33)
MCHC RBC AUTO-ENTMCNC: 35.4 G/DL (ref 33.6–35)
MCV RBC AUTO: 92.1 FL (ref 81.4–97.8)
N GONORRHOEA DNA SPEC QL NAA+PROBE: NEGATIVE
PLATELET # BLD AUTO: 302 K/UL (ref 164–446)
PMV BLD AUTO: 9.8 FL (ref 9–12.9)
RBC # BLD AUTO: 4.54 M/UL (ref 4.2–5.4)
RH BLD: NORMAL
RUBV AB SER QL: 314 IU/ML
SPECIMEN SOURCE: NORMAL
T PALLIDUM AB SER QL IA: ABNORMAL
TSH SERPL DL<=0.005 MIU/L-ACNC: 2.8 UIU/ML (ref 0.38–5.33)
WBC # BLD AUTO: 6.7 K/UL (ref 4.8–10.8)

## 2023-01-16 PROCEDURE — 86762 RUBELLA ANTIBODY: CPT

## 2023-01-16 PROCEDURE — 87340 HEPATITIS B SURFACE AG IA: CPT

## 2023-01-16 PROCEDURE — 85027 COMPLETE CBC AUTOMATED: CPT

## 2023-01-16 PROCEDURE — 87591 N.GONORRHOEAE DNA AMP PROB: CPT

## 2023-01-16 PROCEDURE — 86901 BLOOD TYPING SEROLOGIC RH(D): CPT

## 2023-01-16 PROCEDURE — 36415 COLL VENOUS BLD VENIPUNCTURE: CPT

## 2023-01-16 PROCEDURE — 86850 RBC ANTIBODY SCREEN: CPT

## 2023-01-16 PROCEDURE — 87389 HIV-1 AG W/HIV-1&-2 AB AG IA: CPT

## 2023-01-16 PROCEDURE — 87086 URINE CULTURE/COLONY COUNT: CPT

## 2023-01-16 PROCEDURE — 87491 CHLMYD TRACH DNA AMP PROBE: CPT

## 2023-01-16 PROCEDURE — 84443 ASSAY THYROID STIM HORMONE: CPT

## 2023-01-16 PROCEDURE — 87077 CULTURE AEROBIC IDENTIFY: CPT

## 2023-01-16 PROCEDURE — 86900 BLOOD TYPING SEROLOGIC ABO: CPT

## 2023-01-16 PROCEDURE — 86780 TREPONEMA PALLIDUM: CPT

## 2023-01-16 PROCEDURE — 86803 HEPATITIS C AB TEST: CPT

## 2023-01-18 PROBLEM — Z34.80 PRENATAL CARE, SUBSEQUENT PREGNANCY: Status: ACTIVE | Noted: 2023-01-18

## 2023-01-18 LAB
BACTERIA UR CULT: NORMAL
SIGNIFICANT IND 70042: NORMAL
SITE SITE: NORMAL
SOURCE SOURCE: NORMAL

## 2023-02-14 ENCOUNTER — INITIAL PRENATAL (OUTPATIENT)
Dept: OBGYN | Facility: CLINIC | Age: 33
End: 2023-02-14
Payer: OTHER GOVERNMENT

## 2023-02-14 ENCOUNTER — HOSPITAL ENCOUNTER (OUTPATIENT)
Facility: MEDICAL CENTER | Age: 33
End: 2023-02-14
Attending: OBSTETRICS & GYNECOLOGY
Payer: OTHER GOVERNMENT

## 2023-02-14 VITALS — WEIGHT: 165.3 LBS | SYSTOLIC BLOOD PRESSURE: 128 MMHG | BODY MASS INDEX: 24.41 KG/M2 | DIASTOLIC BLOOD PRESSURE: 69 MMHG

## 2023-02-14 DIAGNOSIS — O34.12 UTERINE FIBROIDS AFFECTING PREGNANCY IN SECOND TRIMESTER: ICD-10-CM

## 2023-02-14 DIAGNOSIS — D25.9 UTERINE FIBROIDS AFFECTING PREGNANCY IN SECOND TRIMESTER: ICD-10-CM

## 2023-02-14 DIAGNOSIS — Z34.92 SECOND TRIMESTER PREGNANCY: ICD-10-CM

## 2023-02-14 DIAGNOSIS — Z98.891 HISTORY OF CESAREAN DELIVERY: ICD-10-CM

## 2023-02-14 PROCEDURE — 88175 CYTOPATH C/V AUTO FLUID REDO: CPT

## 2023-02-14 PROCEDURE — 0502F SUBSEQUENT PRENATAL CARE: CPT | Performed by: OBSTETRICS & GYNECOLOGY

## 2023-02-14 PROCEDURE — 87624 HPV HI-RISK TYP POOLED RSLT: CPT

## 2023-02-14 ASSESSMENT — FIBROSIS 4 INDEX: FIB4 SCORE: 0.49

## 2023-02-14 ASSESSMENT — EDINBURGH POSTNATAL DEPRESSION SCALE (EPDS)
I HAVE BLAMED MYSELF UNNECESSARILY WHEN THINGS WENT WRONG: NOT VERY OFTEN
I HAVE BEEN ANXIOUS OR WORRIED FOR NO GOOD REASON: YES, SOMETIMES
THINGS HAVE BEEN GETTING ON TOP OF ME: NO, MOST OF THE TIME I HAVE COPED QUITE WELL
I HAVE BEEN SO UNHAPPY THAT I HAVE BEEN CRYING: ONLY OCCASIONALLY
TOTAL SCORE: 6
I HAVE FELT SCARED OR PANICKY FOR NO GOOD REASON: NO, NOT AT ALL
I HAVE FELT SAD OR MISERABLE: NOT VERY OFTEN
I HAVE LOOKED FORWARD WITH ENJOYMENT TO THINGS: AS MUCH AS I EVER DID
THE THOUGHT OF HARMING MYSELF HAS OCCURRED TO ME: NEVER
I HAVE BEEN SO UNHAPPY THAT I HAVE HAD DIFFICULTY SLEEPING: NOT AT ALL
I HAVE BEEN ABLE TO LAUGH AND SEE THE FUNNY SIDE OF THINGS: AS MUCH AS I ALWAYS COULD

## 2023-02-14 NOTE — PROGRESS NOTES
Pt here for NOB visit  LMP: 10/20/2022     : 1/5/2023   KAILYN:7/27/2023  Confirmed good ph#, pharmacy, drug allergy, and medications on file.  Last Pap:6/2021, neg per Pt.  Abnormal pap:Never.  Pt declines CF.  Pt desires AFP.  Pt declines FLU vaccine today.  Pt states no other complaints for today.  EPDS:6

## 2023-02-14 NOTE — PROGRESS NOTES
CC: New Ob visit    Subjective Ms. Lina Poon  16w5d by LMP= cw 11 week US presents for prenatal care. Today is her first prenatal visit at [address]. She denies any contractions/cramping, leakage of fluid, vaginal bleeding. She does not feel movement yet.    I have reviewed the patients' medical, surgical, gynecological, obstetrical, social, and family histories, medications and available lab results and pertinent notes are as follows:    OB History    Para Term  AB Living   2 1 1 0 0 1   SAB IAB Ectopic Molar Multiple Live Births   0 0 0 0 0 1       Past Gynecological History:    Denies fibroids, ovarian cysts, abnormal pap smears, STDs. She denies ever having surgery on her cervix, uterus, or ovaries in the past. Last pap smear was a few years ago.     Past Medical History:   Diagnosis Date    Cancer (HCC)     Thyroid disease        History reviewed. No pertinent surgical history.    Social History     Socioeconomic History    Marital status:      Spouse name: Not on file    Number of children: Not on file    Years of education: Not on file    Highest education level: Not on file   Occupational History    Not on file   Tobacco Use    Smoking status: Never    Smokeless tobacco: Never   Vaping Use    Vaping Use: Never used   Substance and Sexual Activity    Alcohol use: Not Currently     Comment: occasional    Drug use: Never    Sexual activity: Yes     Partners: Male     Birth control/protection: None     Comment:  anfd planned pergnancy.   Other Topics Concern    Not on file   Social History Narrative    Not on file     Social Determinants of Health     Financial Resource Strain: Not on file   Food Insecurity: Not on file   Transportation Needs: Not on file   Physical Activity: Not on file   Stress: Not on file   Social Connections: Not on file   Intimate Partner Violence: Not on file   Housing Stability: Not on file       Family History:    Family History   Problem Relation Age of Onset    No Known Problems Mother     No Known Problems Father        Genetic Screening/Teratology Counseling- Includes patient, baby's father, or anyone in either family with:  Patient's age 35 years or older as of estimated date of delivery: No     Thalassemia (Italian, Greek, Mediterranean, or  background): MCV less than 80: No     Neural tube defect (Meningomyelocele, Spina bifida, or Anencephaly): No     Congenital heart defect: No     Down syndrome: No     Ilya-Sachs (Ashkenazi Catholic, Cajun, [place]an [place]): No     Canavan disease (Ashkenazi Catholic): No     Familial dysautonomia (Ashkenazi Catholic): No     Sickle cell disease or trait (): No     Hemophilia or other blood disorders: No     Muscular dystrophy: No    Cystic fibrosis: No     Loudon's chorea: No     Mental retardation/autism: No     Other inherited genetic or chromosomal disorder: No     Maternal metabolic disorder (eg. Type 1 diabetes, PKU): No     Patient or baby's father had child with birth defects not listed above: No     Recurrent pregnancy loss, or a stillbirth: No     Medications (including supplements, vitamins, herbs, or OTC drugs)/illicit/recreational drugs/alcohol since last menstrual period: No               Infection Prevention  1. High Risk For HIV: No 6. Rash Or Illness Since LMP: No     2. High Risk For Hepatitis B or C: No 7. History Of STD, GC, Chlamydia, HPV Syphilis: No     3. Live With Someone With TB Or Exposed To TB: No 8. Have a cat in the home?: No     4. Patient Or Partner Has A History Of Herpes: No 8a. Responsible for changing the litter?: No     5. History of Chicken Pox: No 9. Other (See Comments Below): No            Allergies: Patient has no known allergies.    Objective:/69   Wt 165 lb 4.8 oz     Objective:   Vitals:    02/14/23 1431   BP: 128/69       Prenatal Physical:  General Exam:  HEENT: normal    Heart: normal    Thyroid: normal    Lungs:  normal    Lymph Nodes: normal    Breasts: normal    Neurological: normal    Abdomen: normal    Skin: normal    Extremities: normal    Pelvic Exam:  Vulva:  Normal  Vagina:  Normal  Cervix:  Normal  Adnexa:  Normal  Rectum:  Normal    Lab: No results found for this or any previous visit (from the past 672 hour(s)).    Ultrasound: Reviewed initial scan and KAILYN is by LMP = cw US    Assessment:    --Normal Exam at 16 weeks    --history of  for previa- wants repeat.      Plan:    Reviewed the patients risk factors for this pregnancy and recommend the need for prenatal care    Genetic screening was discussed with literature on Prenatal Screening, Cystic Fibrosis, and SMA provided and the patient plans to decline    Discuss importance of water intake, controlled caloric intake, eating 5 small meals throughout the day to maintain blood sugar and taking PNV    Discuss importance of exercise, as well as rest    Prenatal labs reviewed and normal    Discuss policy for OB care at [facility]     Follow up in 4 weeks for next visit

## 2023-02-15 DIAGNOSIS — Z34.92 SECOND TRIMESTER PREGNANCY: ICD-10-CM

## 2023-02-15 DIAGNOSIS — Q21.0 VSD (VENTRICULAR SEPTAL DEFECT): ICD-10-CM

## 2023-02-15 LAB
CYTOLOGY REG CYTOL: NORMAL
HPV HR 12 DNA CVX QL NAA+PROBE: NEGATIVE
HPV16 DNA SPEC QL NAA+PROBE: NEGATIVE
HPV18 DNA SPEC QL NAA+PROBE: NEGATIVE
SPECIMEN SOURCE: NORMAL

## 2023-02-16 ENCOUNTER — HOSPITAL ENCOUNTER (OUTPATIENT)
Dept: LAB | Facility: MEDICAL CENTER | Age: 33
End: 2023-02-16
Attending: INTERNAL MEDICINE
Payer: OTHER GOVERNMENT

## 2023-02-16 DIAGNOSIS — E89.0 POSTOPERATIVE HYPOTHYROIDISM: ICD-10-CM

## 2023-02-16 DIAGNOSIS — C73 PAPILLARY THYROID CARCINOMA (HCC): ICD-10-CM

## 2023-02-16 DIAGNOSIS — E06.3 HASHIMOTO'S DISEASE: ICD-10-CM

## 2023-02-16 DIAGNOSIS — E55.9 VITAMIN D DEFICIENCY: ICD-10-CM

## 2023-02-16 LAB
25(OH)D3 SERPL-MCNC: 34 NG/ML (ref 30–100)
ALBUMIN SERPL BCP-MCNC: 4.4 G/DL (ref 3.2–4.9)
ALBUMIN/GLOB SERPL: 1.5 G/DL
ALP SERPL-CCNC: 57 U/L (ref 30–99)
ALT SERPL-CCNC: 8 U/L (ref 2–50)
ANION GAP SERPL CALC-SCNC: 12 MMOL/L (ref 7–16)
AST SERPL-CCNC: 14 U/L (ref 12–45)
BILIRUB SERPL-MCNC: 1.1 MG/DL (ref 0.1–1.5)
BUN SERPL-MCNC: 8 MG/DL (ref 8–22)
CALCIUM ALBUM COR SERPL-MCNC: 8.8 MG/DL (ref 8.5–10.5)
CALCIUM SERPL-MCNC: 9.1 MG/DL (ref 8.5–10.5)
CHLORIDE SERPL-SCNC: 104 MMOL/L (ref 96–112)
CO2 SERPL-SCNC: 21 MMOL/L (ref 20–33)
CREAT SERPL-MCNC: 0.5 MG/DL (ref 0.5–1.4)
GFR SERPLBLD CREATININE-BSD FMLA CKD-EPI: 127 ML/MIN/1.73 M 2
GLOBULIN SER CALC-MCNC: 3 G/DL (ref 1.9–3.5)
GLUCOSE SERPL-MCNC: 109 MG/DL (ref 65–99)
POTASSIUM SERPL-SCNC: 3.9 MMOL/L (ref 3.6–5.5)
PROT SERPL-MCNC: 7.4 G/DL (ref 6–8.2)
SODIUM SERPL-SCNC: 137 MMOL/L (ref 135–145)
T4 FREE SERPL-MCNC: 1.27 NG/DL (ref 0.93–1.7)
TSH SERPL DL<=0.005 MIU/L-ACNC: 3.65 UIU/ML (ref 0.38–5.33)

## 2023-02-16 PROCEDURE — 82306 VITAMIN D 25 HYDROXY: CPT

## 2023-02-16 PROCEDURE — 84432 ASSAY OF THYROGLOBULIN: CPT

## 2023-02-16 PROCEDURE — 80053 COMPREHEN METABOLIC PANEL: CPT

## 2023-02-16 PROCEDURE — 84443 ASSAY THYROID STIM HORMONE: CPT

## 2023-02-16 PROCEDURE — 86800 THYROGLOBULIN ANTIBODY: CPT

## 2023-02-16 PROCEDURE — 84439 ASSAY OF FREE THYROXINE: CPT

## 2023-02-16 PROCEDURE — 36415 COLL VENOUS BLD VENIPUNCTURE: CPT

## 2023-02-18 LAB
THYROGLOB AB SERPL-ACNC: <0.9 IU/ML (ref 0–4)
THYROGLOB SERPL-MCNC: 3.3 NG/ML (ref 1.3–31.8)
THYROGLOB SERPL-MCNC: NORMAL NG/ML (ref 1.3–31.8)

## 2023-02-22 ENCOUNTER — OFFICE VISIT (OUTPATIENT)
Dept: ENDOCRINOLOGY | Facility: MEDICAL CENTER | Age: 33
End: 2023-02-22
Attending: INTERNAL MEDICINE
Payer: OTHER GOVERNMENT

## 2023-02-22 VITALS
HEART RATE: 80 BPM | BODY MASS INDEX: 25.33 KG/M2 | DIASTOLIC BLOOD PRESSURE: 70 MMHG | SYSTOLIC BLOOD PRESSURE: 102 MMHG | WEIGHT: 171 LBS | OXYGEN SATURATION: 99 % | HEIGHT: 69 IN

## 2023-02-22 DIAGNOSIS — E06.3 HASHIMOTO'S DISEASE: ICD-10-CM

## 2023-02-22 DIAGNOSIS — C73 PAPILLARY THYROID CARCINOMA (HCC): ICD-10-CM

## 2023-02-22 DIAGNOSIS — E55.9 VITAMIN D DEFICIENCY: ICD-10-CM

## 2023-02-22 DIAGNOSIS — E89.0 POSTOPERATIVE HYPOTHYROIDISM: ICD-10-CM

## 2023-02-22 DIAGNOSIS — Z3A.18 18 WEEKS GESTATION OF PREGNANCY: ICD-10-CM

## 2023-02-22 PROCEDURE — 99211 OFF/OP EST MAY X REQ PHY/QHP: CPT | Performed by: INTERNAL MEDICINE

## 2023-02-22 PROCEDURE — 99214 OFFICE O/P EST MOD 30 MIN: CPT | Performed by: INTERNAL MEDICINE

## 2023-02-22 RX ORDER — LEVOTHYROXINE SODIUM 0.15 MG/1
150 TABLET ORAL
Qty: 90 TABLET | Refills: 1 | Status: SHIPPED | OUTPATIENT
Start: 2023-02-22 | End: 2023-05-22 | Stop reason: SDUPTHER

## 2023-02-22 ASSESSMENT — FIBROSIS 4 INDEX: FIB4 SCORE: 0.52

## 2023-03-09 ENCOUNTER — APPOINTMENT (OUTPATIENT)
Dept: RADIOLOGY | Facility: MEDICAL CENTER | Age: 33
End: 2023-03-09
Attending: OBSTETRICS & GYNECOLOGY
Payer: OTHER GOVERNMENT

## 2023-03-09 DIAGNOSIS — Z34.92 SECOND TRIMESTER PREGNANCY: ICD-10-CM

## 2023-03-09 PROCEDURE — 76805 OB US >/= 14 WKS SNGL FETUS: CPT

## 2023-03-23 ENCOUNTER — ROUTINE PRENATAL (OUTPATIENT)
Dept: OBGYN | Facility: CLINIC | Age: 33
End: 2023-03-23
Payer: OTHER GOVERNMENT

## 2023-03-23 VITALS — SYSTOLIC BLOOD PRESSURE: 122 MMHG | WEIGHT: 177 LBS | DIASTOLIC BLOOD PRESSURE: 74 MMHG | BODY MASS INDEX: 26.14 KG/M2

## 2023-03-23 DIAGNOSIS — D25.9 LEIOMYOMA OF UTERUS AFFECTING PREGNANCY IN SECOND TRIMESTER: ICD-10-CM

## 2023-03-23 DIAGNOSIS — O34.12 LEIOMYOMA OF UTERUS AFFECTING PREGNANCY IN SECOND TRIMESTER: ICD-10-CM

## 2023-03-23 DIAGNOSIS — Z34.82 ENCOUNTER FOR SUPERVISION OF OTHER NORMAL PREGNANCY IN SECOND TRIMESTER: ICD-10-CM

## 2023-03-23 PROCEDURE — 0502F SUBSEQUENT PRENATAL CARE: CPT | Performed by: ADVANCED PRACTICE MIDWIFE

## 2023-03-23 ASSESSMENT — FIBROSIS 4 INDEX: FIB4 SCORE: 0.52

## 2023-03-23 NOTE — PROGRESS NOTES
Has patient been referred to outside provider?   Yes; Dr. Lopez to clear anatomy and follow up fibroids. Seeing endocrinology with regular follow up    Records available on chart?   yes    SUBJECTIVE:  Pt is a 32 y.o.   at 22w0d  gestation. Presents today for follow-up prenatal care. Has not been seen in ER or L & D since last visit. Reports good  fetal movement.     Leaking of fluid?       no    Dysuria?       no    Headaches?      no    N/V?          no    Cramping/contractions?      no    Patient has appointment with Jessica mid April. Next ultrasound in May and will complete labs immediately before. Taking levothyroxine.     Patient reporting poor sleep at current. No difficulty falling asleep but difficulty staying asleep due to multiple position changes.     OBJECTIVE:   /74   Wt 177 lb   LMP 10/20/2022 (Exact Date)   BMI 26.14 kg/m²   Patients' weight gain, fluid intake and exercise level discussed.  Vitals, fundal height , fetal position, and FHR reviewed on flowsheet    Lab:No results found for this or any previous visit (from the past 336 hour(s)).    ASSESSMENT/ PLAN:   - IUP at 22w0d    - S > D   -   Patient Active Problem List    Diagnosis Date Noted    Prenatal care, subsequent pregnancy 2023    History of  delivery 2023    Papillary thyroid carcinoma (HCC) 2022    Postoperative hypothyroidism 2022    Vitamin D deficiency 2022         Tdap: offer at upcoming visit.    Incomplete Anatomy US  Discussed anatomy US with patient that was unable to clear spine and 3VV. At this time, limited concern but just need clearer images.     Fibroid  Regarding fibroid, patient was aware of one fibroid in her previous pregnancy. Discussed that in some patients, the fibroids will grow and in some others, the findings are stable. At this time, will continue with Jessica for probable growth US. However, we did discuss that depending on location, it should not have  significant impact on pregnancy outside of increased potential for shortness of breath.     Previous c/s  Planning repeat  section at 39 weeks. First was for previa done at 37 weeks.     - S/sx pregnancy and labor warning signs vs general discomforts discussed  - Fetal movements and kick counts reviewed. Adequate hydration reinforced.  - Anticipatory guidance provided. Regarding intermittent insomnia, discussed that she can trial benadryl intermittently if she begins noticing increased irritability or mood changes.   - RTC in 4 weeks for routine prenatal care.

## 2023-03-23 NOTE — PROGRESS NOTES
Pt here today for OB follow up  Pt states no complaints   Reports +  Good # [phone]  Pharmacy Confirmed.  Chaperone offered and not indicated.   Pt declines AFP.

## 2023-04-18 PROBLEM — Q21.0 VSD (VENTRICULAR SEPTAL DEFECT): Status: ACTIVE | Noted: 2023-04-18

## 2023-04-20 ENCOUNTER — ROUTINE PRENATAL (OUTPATIENT)
Dept: OBGYN | Facility: CLINIC | Age: 33
End: 2023-04-20
Payer: OTHER GOVERNMENT

## 2023-04-20 VITALS — BODY MASS INDEX: 27.02 KG/M2 | DIASTOLIC BLOOD PRESSURE: 84 MMHG | WEIGHT: 183 LBS | SYSTOLIC BLOOD PRESSURE: 125 MMHG

## 2023-04-20 DIAGNOSIS — Z34.92 SECOND TRIMESTER PREGNANCY: ICD-10-CM

## 2023-04-20 PROCEDURE — 0502F SUBSEQUENT PRENATAL CARE: CPT | Performed by: OBSTETRICS & GYNECOLOGY

## 2023-04-20 ASSESSMENT — FIBROSIS 4 INDEX: FIB4 SCORE: 0.52

## 2023-04-20 NOTE — PROGRESS NOTES
Pt here today for OB follow up  Pt states No Concerns  Reports +FM Good  Good # [phone] (home)   Pharmacy Confirmed.  Chaperone offered and declined.

## 2023-04-28 ENCOUNTER — HOSPITAL ENCOUNTER (OUTPATIENT)
Dept: LAB | Facility: MEDICAL CENTER | Age: 33
End: 2023-04-28
Attending: OBSTETRICS & GYNECOLOGY
Payer: OTHER GOVERNMENT

## 2023-04-28 DIAGNOSIS — Z34.92 SECOND TRIMESTER PREGNANCY: ICD-10-CM

## 2023-04-28 LAB
ERYTHROCYTE [DISTWIDTH] IN BLOOD BY AUTOMATED COUNT: 40.3 FL (ref 35.9–50)
GLUCOSE 1H P 50 G GLC PO SERPL-MCNC: 87 MG/DL (ref 70–139)
HCT VFR BLD AUTO: 38.5 % (ref 37–47)
HGB BLD-MCNC: 13.5 G/DL (ref 12–16)
MCH RBC QN AUTO: 33.3 PG (ref 27–33)
MCHC RBC AUTO-ENTMCNC: 35.1 G/DL (ref 33.6–35)
MCV RBC AUTO: 95.1 FL (ref 81.4–97.8)
PLATELET # BLD AUTO: 234 K/UL (ref 164–446)
PMV BLD AUTO: 10.4 FL (ref 9–12.9)
RBC # BLD AUTO: 4.05 M/UL (ref 4.2–5.4)
T PALLIDUM AB SER QL IA: NORMAL
WBC # BLD AUTO: 8.2 K/UL (ref 4.8–10.8)

## 2023-04-28 PROCEDURE — 36415 COLL VENOUS BLD VENIPUNCTURE: CPT

## 2023-04-28 PROCEDURE — 86780 TREPONEMA PALLIDUM: CPT

## 2023-04-28 PROCEDURE — 82950 GLUCOSE TEST: CPT

## 2023-04-28 PROCEDURE — 85027 COMPLETE CBC AUTOMATED: CPT

## 2023-04-29 ENCOUNTER — HOSPITAL ENCOUNTER (EMERGENCY)
Facility: MEDICAL CENTER | Age: 33
End: 2023-04-29
Attending: OBSTETRICS & GYNECOLOGY | Admitting: OBSTETRICS & GYNECOLOGY
Payer: OTHER GOVERNMENT

## 2023-04-29 VITALS
OXYGEN SATURATION: 97 % | BODY MASS INDEX: 26.36 KG/M2 | WEIGHT: 178 LBS | DIASTOLIC BLOOD PRESSURE: 81 MMHG | TEMPERATURE: 96.9 F | RESPIRATION RATE: 19 BRPM | HEIGHT: 69 IN | HEART RATE: 83 BPM | SYSTOLIC BLOOD PRESSURE: 123 MMHG

## 2023-04-29 LAB
ALBUMIN SERPL BCP-MCNC: 3.7 G/DL (ref 3.2–4.9)
ALBUMIN/GLOB SERPL: 1.3 G/DL
ALP SERPL-CCNC: 69 U/L (ref 30–99)
ALT SERPL-CCNC: 12 U/L (ref 2–50)
ANION GAP SERPL CALC-SCNC: 13 MMOL/L (ref 7–16)
APPEARANCE UR: CLEAR
AST SERPL-CCNC: 17 U/L (ref 12–45)
BASOPHILS # BLD AUTO: 0.4 % (ref 0–1.8)
BASOPHILS # BLD: 0.03 K/UL (ref 0–0.12)
BILIRUB SERPL-MCNC: 0.9 MG/DL (ref 0.1–1.5)
BUN SERPL-MCNC: 4 MG/DL (ref 8–22)
CALCIUM ALBUM COR SERPL-MCNC: 8.8 MG/DL (ref 8.5–10.5)
CALCIUM SERPL-MCNC: 8.6 MG/DL (ref 8.5–10.5)
CHLORIDE SERPL-SCNC: 106 MMOL/L (ref 96–112)
CO2 SERPL-SCNC: 17 MMOL/L (ref 20–33)
COLOR UR AUTO: YELLOW
CREAT SERPL-MCNC: 0.28 MG/DL (ref 0.5–1.4)
EOSINOPHIL # BLD AUTO: 0.04 K/UL (ref 0–0.51)
EOSINOPHIL NFR BLD: 0.5 % (ref 0–6.9)
ERYTHROCYTE [DISTWIDTH] IN BLOOD BY AUTOMATED COUNT: 39.8 FL (ref 35.9–50)
GFR SERPLBLD CREATININE-BSD FMLA CKD-EPI: 146 ML/MIN/1.73 M 2
GLOBULIN SER CALC-MCNC: 2.9 G/DL (ref 1.9–3.5)
GLUCOSE SERPL-MCNC: 89 MG/DL (ref 65–99)
GLUCOSE UR QL STRIP.AUTO: NEGATIVE MG/DL
HCT VFR BLD AUTO: 38.2 % (ref 37–47)
HGB BLD-MCNC: 13 G/DL (ref 12–16)
IMM GRANULOCYTES # BLD AUTO: 0.08 K/UL (ref 0–0.11)
IMM GRANULOCYTES NFR BLD AUTO: 1 % (ref 0–0.9)
KETONES UR QL STRIP.AUTO: NEGATIVE MG/DL
LEUKOCYTE ESTERASE UR QL STRIP.AUTO: NEGATIVE
LIPASE SERPL-CCNC: 55 U/L (ref 11–82)
LYMPHOCYTES # BLD AUTO: 1.62 K/UL (ref 1–4.8)
LYMPHOCYTES NFR BLD: 19.5 % (ref 22–41)
MCH RBC QN AUTO: 32.4 PG (ref 27–33)
MCHC RBC AUTO-ENTMCNC: 34 G/DL (ref 33.6–35)
MCV RBC AUTO: 95.3 FL (ref 81.4–97.8)
MONOCYTES # BLD AUTO: 0.46 K/UL (ref 0–0.85)
MONOCYTES NFR BLD AUTO: 5.5 % (ref 0–13.4)
NEUTROPHILS # BLD AUTO: 6.07 K/UL (ref 2–7.15)
NEUTROPHILS NFR BLD: 73.1 % (ref 44–72)
NITRITE UR QL STRIP.AUTO: NEGATIVE
NRBC # BLD AUTO: 0 K/UL
NRBC BLD-RTO: 0 /100 WBC
PH UR STRIP.AUTO: 7 [PH] (ref 5–8)
PLATELET # BLD AUTO: 224 K/UL (ref 164–446)
PMV BLD AUTO: 9.6 FL (ref 9–12.9)
POTASSIUM SERPL-SCNC: 3.9 MMOL/L (ref 3.6–5.5)
PROT SERPL-MCNC: 6.6 G/DL (ref 6–8.2)
PROT UR QL STRIP: NEGATIVE MG/DL
RBC # BLD AUTO: 4.01 M/UL (ref 4.2–5.4)
RBC UR QL AUTO: NEGATIVE
SODIUM SERPL-SCNC: 136 MMOL/L (ref 135–145)
SP GR UR STRIP.AUTO: 1.02 (ref 1–1.03)
WBC # BLD AUTO: 8.3 K/UL (ref 4.8–10.8)

## 2023-04-29 PROCEDURE — A9270 NON-COVERED ITEM OR SERVICE: HCPCS

## 2023-04-29 PROCEDURE — 700102 HCHG RX REV CODE 250 W/ 637 OVERRIDE(OP)

## 2023-04-29 PROCEDURE — 99284 EMERGENCY DEPT VISIT MOD MDM: CPT | Mod: GC | Performed by: OBSTETRICS & GYNECOLOGY

## 2023-04-29 PROCEDURE — 36415 COLL VENOUS BLD VENIPUNCTURE: CPT

## 2023-04-29 PROCEDURE — 85025 COMPLETE CBC W/AUTO DIFF WBC: CPT

## 2023-04-29 PROCEDURE — 80053 COMPREHEN METABOLIC PANEL: CPT

## 2023-04-29 PROCEDURE — 81002 URINALYSIS NONAUTO W/O SCOPE: CPT

## 2023-04-29 PROCEDURE — 83690 ASSAY OF LIPASE: CPT

## 2023-04-29 PROCEDURE — 99282 EMERGENCY DEPT VISIT SF MDM: CPT

## 2023-04-29 RX ORDER — SIMETHICONE 125 MG
125 TABLET,CHEWABLE ORAL ONCE
Status: COMPLETED | OUTPATIENT
Start: 2023-04-29 | End: 2023-04-29

## 2023-04-29 RX ORDER — SIMETHICONE 125 MG
125 TABLET,CHEWABLE ORAL 3 TIMES DAILY PRN
Status: DISCONTINUED | OUTPATIENT
Start: 2023-04-29 | End: 2023-04-29

## 2023-04-29 RX ADMIN — SIMETHICONE 125 MG: 125 TABLET, CHEWABLE ORAL at 12:46

## 2023-04-29 RX ADMIN — LIDOCAINE HYDROCHLORIDE 30 ML: 20 SOLUTION OROPHARYNGEAL at 12:46

## 2023-04-29 ASSESSMENT — PAIN SCALES - GENERAL: PAINLEVEL: 5 - MODERATE PAIN

## 2023-04-29 ASSESSMENT — FIBROSIS 4 INDEX: FIB4 SCORE: 0.68

## 2023-04-29 NOTE — PROGRESS NOTES
1118-Pt here from home with c/o constant abdominal pain, started last about 10pm. Pt doesn't think she is bertram because its constant uncomfortable abd pain. Pt denies any leaking or bleeding and reports +FM. Pt placed on monitors (us and toco) POC dicussed with pt. Pt denies any questions or needs at this time.   1140- Dr Nation notified. MD will be down to see pt.  1200-Dr Nation at bedside  1220-Orders received.   1235-Orders reviewed with pt. Pt states understanding and denies any questions.   1240-Dr Mera at bedside. Meds given per Orders.  1348-Labs results back, Dr Mera notified. Dr Nation at bedside. Pt does believe she is feeling a little better with medications, pain in much more manageable. POC dicussed. Discharge order and instructions received.  1400-Discharge instructions reviewed with pt. Pt instructed to remain on [place] diet for rest of today and tomorrow, diet reviewed.  labor precautions and OTC meds reviewed. Pt denies any questions   1406-Pt discharged home.

## 2023-04-29 NOTE — DISCHARGE INSTRUCTIONS
Take Maalox or mylanta as needed.   You can also take Gas X if needed.   Mint tea, walking and laying on left side can also help with gas pain.         General Instructions:  If you think you are in labor, time contractions (lying on your left side) from the beginning of one contraction to the beginning of the next contraction for at least one hour.  Increase fluid intake: you should consume 10-12 8 oz glasses of non-caffeinated fluid per day.  Report any pressure or burning on urination to your physician.  Monitor fetal movement: If you notice an absence or decrease in fetal movement, drink a large glass of water and rest on your side.  If there is no increase in movement, call your physician or go to the hospital for further evaluation.  Report any sudden, sharp abdominal pain.  Report any bleeding.  Spotting or pinkish discharge is normal after vaginal exam.  You may also spot after sexual intercourse.    Pre-term Labor (<37 weeks):  Call your physician or return to the hospital if:  You have painless regular contractions more than 4 in one hour.  Your water breaks (remember time and color).  You have menstrual-like cramps, a low dull backache or pressure in your pelvis or back.  Your baby does not move enough to complete the daily kick count (10 movements in 2 hours).  Your baby moves much less often than on the days before or you have not felt your baby move all day.  Please review the MEDICATION LIST section of your AFTER VISIT SUMMARY document.  Take your medication as prescribed    [place] Diet  A bland diet consists of foods that are often soft and do not have a lot of fat, fiber, or extra seasonings. Foods without fat, fiber, or seasoning are easier for the body to digest. They are also less likely to irritate your mouth, throat, stomach, and other parts of your digestive system. A bland diet is sometimes called a BRAT diet.  What is my plan?  Your health care provider or food and nutrition specialist  (dietitian) may recommend specific changes to your diet to prevent symptoms or to treat your symptoms. These changes may include:  Eating small meals often.  Cooking food until it is soft enough to chew easily.  Chewing your food well.  Drinking fluids slowly.  Not eating foods that are very spicy, sour, or fatty.  Not eating citrus fruits, such as oranges and grapefruit.  What do I need to know about this diet?  Eat a variety of foods from the bland diet food list.  Do not follow a bland diet longer than needed.  Ask your health care provider whether you should take vitamins or supplements.  What foods can I eat?  Grains    Hot cereals, such as cream of wheat. Rice. Bread, crackers, or tortillas made from refined white flour.  Vegetables  Canned or cooked vegetables. Mashed or boiled potatoes.  Fruits    Bananas. Applesauce. Other types of cooked or canned fruit with the skin and seeds removed, such as canned peaches or pears.  Meats and other proteins    Scrambled eggs. Creamy peanut butter or other nut butters. Lean, well-cooked meats, such as chicken or fish. Tofu. Soups or broths.  Dairy  Low-fat dairy products, such as milk, cottage cheese, or yogurt.  Beverages    Water. Herbal tea. Apple juice.  Fats and oils  Mild salad dressings. Canola or olive oil.  Sweets and desserts  Pudding. Custard. Fruit gelatin. Ice cream.  The items listed above may not be a complete list of recommended foods and beverages. Contact a dietitian for more options.  What foods are not recommended?  Grains  Whole grain breads and cereals.  Vegetables  Raw vegetables.  Fruits  Raw fruits, especially citrus, berries, or dried fruits.  Dairy  Whole fat dairy foods.  Beverages  Caffeinated drinks. Alcohol.  Seasonings and condiments  Strongly flavored seasonings or condiments. Hot sauce. Salsa.  Other foods  Spicy foods. Fried foods. Sour foods, such as pickled or fermented foods. Foods with high sugar content. Foods high in fiber.  The  items listed above may not be a complete list of foods and beverages to avoid. Contact a dietitian for more information.  Summary  A bland diet consists of foods that are often soft and do not have a lot of fat, fiber, or extra seasonings.  Foods without fat, fiber, or seasoning are easier for the body to digest.  Check with your health care provider to see how long you should follow this diet plan. It is not meant to be followed for long periods.  This information is not intended to replace advice given to you by your health care provider. Make sure you discuss any questions you have with your health care provider.  Document Released: 04/10/2017 Document Revised: 01/16/2019 Document Reviewed: 01/16/2019  Elsevier Patient Education © 2020 [employer]          Other Instructions:  Please carefully review your entire AFTER VISIT SUMMARY document for all discharge instructions.

## 2023-04-29 NOTE — ED PROVIDER NOTES
"LABOR AND DELIVERY TRIAGE NOTE    PATIENT ID:  NAME:  Lina Poon  MRN:               [id number]  YOB: 1990     32 y.o. female  at 27w2d by LMP = 11w5d US.    Subjective: Pt reports since 10pm last night she has had diffuse abdominal discomfort. States it is worst at the superior aspect of her abdomen. It is not related to position or eating. Reports 1 episode of vomiting around 5am. Also with associated weakness and feels \"bloated.\"     Patient states yesterday she completed her Glucola testing, also ate fish tacos for dinner. Nobody else at home with these symptoms.     Reports last BM was last night around onset of abdominal discomfort and was normal for her. States she regularly has a BM daily, sometimes hard sometimes loose without blood. Reports her water intake as \"not enough.\" Has Colace at home but has not tried taking.     Also reports a history of partial thyroidectomy in  for which she is now treated with 150 mcg daily levothyroxine. States she was taking 125mcg daily prior to pregnancy.     No persistent nausea. No fevers, chills, pain with urination, urinary frequency, back pain, flank pain.     Patient followed by [facility] and [facility]. Pregnancy complicated by:   1) Secondary hypothyroidism 2/2 partial thyroidectomy in   2) Abnormal fetal US with Aneurysmal foramen ovale, VSD, bilateral pyelectasis with recommendation for  echocardiogram.      negative for contractions  negative pain   negative for LOF  negative for vaginal bleeding  positive for fetal movement    PNL:  Blood Type O positive, Rubella immune, HIV neg, TrepAb neg, HBsAg NR, GC/CT neg/neg  1 HR GTT: 87  3 HR GTT: N/A  GBS unknown     ROS: As above    PMHx:   Past Medical History:   Diagnosis Date    Cancer (HCC)     Thyroid disease         OB History    Para Term  AB Living   2 1 1     1   SAB IAB Ectopic Molar Multiple Live Births             1      # Outcome Date GA Lbr Ruslan/2nd Weight Sex " "Delivery Anes PTL Lv   2 Current            1 Term 05/10/21 37w0d  3.204 kg (7 lb 1 oz) F CS-LTranv Spinal N CHELSEY      Complications: Placenta Previa       PSHx:  No past surgical history on file.    Social Hx:  Social History     Tobacco Use    Smoking status: Never    Smokeless tobacco: Never   Vaping Use    Vaping Use: Never used   Substance Use Topics    Alcohol use: Not Currently     Comment: occasional    Drug use: Never         Medications:  No current facility-administered medications on file prior to encounter.     Current Outpatient Medications on File Prior to Encounter   Medication Sig Dispense Refill    levothyroxine (SYNTHROID) 150 MCG Tab Take 1 Tablet by mouth every morning on an empty stomach. 90 Tablet 1    Prenatal MV-Min-Fe Fum-FA-DHA (PRENATAL 1 PO) Take  by mouth.         Allergies:  No Known Allergies       Objective:    Vitals:    23 1120   BP: 123/81   Pulse: 83   Resp: 19   Temp: 36.1 °C (96.9 °F)   TempSrc: Temporal   SpO2: 97%   Weight: 80.7 kg (178 lb)   Height: 1.753 m (5' 9\")     Temp (24hrs), Av.1 °C (96.9 °F), Min:36.1 °C (96.9 °F), Max:36.1 °C (96.9 °F)    General: No acute distress, resting comfortably in bed.  HEENT: normocephalic, nontraumatic, PERRLA, EOMI  Cardiovascular: Heart RRR with no murmurs, rubs or gallops. Distal Pulses 2+  Respiratory: symmetric chest expansion, lungs CTAB, with no wheezes, rales, rhonci  Abdomen: gravid, nontender  Musculoskeletal: STAPLETON spontaneously  Neuro: non focal with no numbness, tingling or changes in sensation    [place]: Uterine Contractions absent.   FHRM: Baseline 140, moderate variability, + accels, no decels    Labs:   Reviewed    Assessment: 32 y.o. female  at 27w2d presents with generalized abdominal discomfort. Review of most recent anatomy with cephalic lie, anterior placenta, JENNIFER 20.8cm. US with aneurysmal foramen ovale, VSD, bilateral pyelectasis with recommendation for  echocardiogram.      Patient with " abdominal discomfort since 10pm last night, completed Glucola testing yesterday. Abdomen non-tender, no CVAT. Vitals WNL. Differential includes reflux, constipation, viral gastroenteritis. Less likely cholecystitis, appendicitis, pancreatitis.     Plan:   - CBC, CMP, Lipase, UA without concern for acute process  - Simethicone, GI Cocktail given and patient reports improvement in condition   - Patient is cleared to return home with family. Encouraged to see MD/DO for increased painful uterine contractions @ 3-5, vaginal bleeding, loss of fluid, or other serious symptoms.    Discussed case with Dr. Mera, [facility] Attending. Case was discussed and attending agreed with plan.    [place] Nation M.D.

## 2023-05-12 ENCOUNTER — HOSPITAL ENCOUNTER (OUTPATIENT)
Dept: LAB | Facility: MEDICAL CENTER | Age: 33
End: 2023-05-12
Attending: INTERNAL MEDICINE
Payer: OTHER GOVERNMENT

## 2023-05-12 DIAGNOSIS — Z3A.18 18 WEEKS GESTATION OF PREGNANCY: ICD-10-CM

## 2023-05-12 DIAGNOSIS — E89.0 POSTOPERATIVE HYPOTHYROIDISM: ICD-10-CM

## 2023-05-12 DIAGNOSIS — C73 PAPILLARY THYROID CARCINOMA (HCC): ICD-10-CM

## 2023-05-12 DIAGNOSIS — E55.9 VITAMIN D DEFICIENCY: ICD-10-CM

## 2023-05-12 LAB
25(OH)D3 SERPL-MCNC: 39 NG/ML (ref 30–100)
ALBUMIN SERPL BCP-MCNC: 3.8 G/DL (ref 3.2–4.9)
ALBUMIN/GLOB SERPL: 1.3 G/DL
ALP SERPL-CCNC: 79 U/L (ref 30–99)
ALT SERPL-CCNC: 7 U/L (ref 2–50)
ANION GAP SERPL CALC-SCNC: 11 MMOL/L (ref 7–16)
AST SERPL-CCNC: 13 U/L (ref 12–45)
BILIRUB SERPL-MCNC: 0.7 MG/DL (ref 0.1–1.5)
BUN SERPL-MCNC: 5 MG/DL (ref 8–22)
CALCIUM ALBUM COR SERPL-MCNC: 8.8 MG/DL (ref 8.5–10.5)
CALCIUM SERPL-MCNC: 8.6 MG/DL (ref 8.5–10.5)
CHLORIDE SERPL-SCNC: 105 MMOL/L (ref 96–112)
CO2 SERPL-SCNC: 22 MMOL/L (ref 20–33)
CREAT SERPL-MCNC: 0.45 MG/DL (ref 0.5–1.4)
GFR SERPLBLD CREATININE-BSD FMLA CKD-EPI: 130 ML/MIN/1.73 M 2
GLOBULIN SER CALC-MCNC: 2.9 G/DL (ref 1.9–3.5)
GLUCOSE SERPL-MCNC: 92 MG/DL (ref 65–99)
POTASSIUM SERPL-SCNC: 4.1 MMOL/L (ref 3.6–5.5)
PROT SERPL-MCNC: 6.7 G/DL (ref 6–8.2)
SODIUM SERPL-SCNC: 138 MMOL/L (ref 135–145)
T4 FREE SERPL-MCNC: 1.17 NG/DL (ref 0.93–1.7)
TSH SERPL DL<=0.005 MIU/L-ACNC: 2.09 UIU/ML (ref 0.38–5.33)

## 2023-05-12 PROCEDURE — 80053 COMPREHEN METABOLIC PANEL: CPT

## 2023-05-12 PROCEDURE — 84443 ASSAY THYROID STIM HORMONE: CPT

## 2023-05-12 PROCEDURE — 36415 COLL VENOUS BLD VENIPUNCTURE: CPT

## 2023-05-12 PROCEDURE — 82306 VITAMIN D 25 HYDROXY: CPT

## 2023-05-12 PROCEDURE — 84439 ASSAY OF FREE THYROXINE: CPT

## 2023-05-17 ENCOUNTER — ROUTINE PRENATAL (OUTPATIENT)
Dept: OBGYN | Facility: CLINIC | Age: 33
End: 2023-05-17
Payer: OTHER GOVERNMENT

## 2023-05-17 VITALS — SYSTOLIC BLOOD PRESSURE: 129 MMHG | DIASTOLIC BLOOD PRESSURE: 83 MMHG | BODY MASS INDEX: 27.02 KG/M2 | WEIGHT: 183 LBS

## 2023-05-17 DIAGNOSIS — Z34.83 ENCOUNTER FOR SUPERVISION OF OTHER NORMAL PREGNANCY IN THIRD TRIMESTER: ICD-10-CM

## 2023-05-17 DIAGNOSIS — Z98.891 HISTORY OF CESAREAN DELIVERY: ICD-10-CM

## 2023-05-17 PROCEDURE — 3079F DIAST BP 80-89 MM HG: CPT | Performed by: OBSTETRICS & GYNECOLOGY

## 2023-05-17 PROCEDURE — 90471 IMMUNIZATION ADMIN: CPT | Performed by: OBSTETRICS & GYNECOLOGY

## 2023-05-17 PROCEDURE — 0502F SUBSEQUENT PRENATAL CARE: CPT | Performed by: OBSTETRICS & GYNECOLOGY

## 2023-05-17 PROCEDURE — 3074F SYST BP LT 130 MM HG: CPT | Performed by: OBSTETRICS & GYNECOLOGY

## 2023-05-17 PROCEDURE — 90715 TDAP VACCINE 7 YRS/> IM: CPT | Performed by: OBSTETRICS & GYNECOLOGY

## 2023-05-17 ASSESSMENT — FIBROSIS 4 INDEX: FIB4 SCORE: 0.7

## 2023-05-17 NOTE — PROGRESS NOTES
Patient received TDAP today     NDC: 49182-740-37  LOT#:   Expiration Date: 2024    Dose: 0.5 ML  Site: Right Deltoid    Patient educated on use and side effects of medication. Name and  verified prior to injection. Pt tolerated? WELL     Verified by BCMA    Administered by Griselda Padilla, Med Ass'joe at 12:05 PM.    Patient Provided Medication: no

## 2023-05-22 ENCOUNTER — PROCEDURE VISIT (OUTPATIENT)
Dept: ENDOCRINOLOGY | Facility: MEDICAL CENTER | Age: 33
End: 2023-05-22
Attending: INTERNAL MEDICINE
Payer: OTHER GOVERNMENT

## 2023-05-22 DIAGNOSIS — E89.0 POSTOPERATIVE HYPOTHYROIDISM: ICD-10-CM

## 2023-05-22 DIAGNOSIS — C73 PAPILLARY THYROID CARCINOMA (HCC): ICD-10-CM

## 2023-05-22 DIAGNOSIS — E55.9 VITAMIN D DEFICIENCY: ICD-10-CM

## 2023-05-22 PROCEDURE — 76536 US EXAM OF HEAD AND NECK: CPT | Performed by: INTERNAL MEDICINE

## 2023-05-22 RX ORDER — LEVOTHYROXINE SODIUM 0.15 MG/1
150 TABLET ORAL
Qty: 90 TABLET | Refills: 1 | Status: SHIPPED | OUTPATIENT
Start: 2023-05-22 | End: 2023-08-25

## 2023-05-22 NOTE — PROGRESS NOTES
Thyroid US done on this procedure visit  Please see dictated POC US report completed by endocrinologist  Patient advised to follow up as planned with labs prior    Chacorta German M.D.

## 2023-06-06 ENCOUNTER — OFFICE VISIT (OUTPATIENT)
Dept: OBGYN | Facility: CLINIC | Age: 33
End: 2023-06-06
Payer: OTHER GOVERNMENT

## 2023-06-06 VITALS — DIASTOLIC BLOOD PRESSURE: 81 MMHG | SYSTOLIC BLOOD PRESSURE: 109 MMHG | WEIGHT: 183 LBS | BODY MASS INDEX: 27.02 KG/M2

## 2023-06-06 DIAGNOSIS — O35.9XX0 KNOWN FETAL ANOMALY, ANTEPARTUM, SINGLE OR UNSPECIFIED FETUS: ICD-10-CM

## 2023-06-06 PROCEDURE — 76816 OB US FOLLOW-UP PER FETUS: CPT | Performed by: OBSTETRICS & GYNECOLOGY

## 2023-06-06 PROCEDURE — 3079F DIAST BP 80-89 MM HG: CPT | Performed by: OBSTETRICS & GYNECOLOGY

## 2023-06-06 PROCEDURE — 3074F SYST BP LT 130 MM HG: CPT | Performed by: OBSTETRICS & GYNECOLOGY

## 2023-06-06 ASSESSMENT — FIBROSIS 4 INDEX: FIB4 SCORE: 0.7

## 2023-06-15 ENCOUNTER — ROUTINE PRENATAL (OUTPATIENT)
Dept: OBGYN | Facility: CLINIC | Age: 33
End: 2023-06-15
Payer: OTHER GOVERNMENT

## 2023-06-15 VITALS — SYSTOLIC BLOOD PRESSURE: 127 MMHG | BODY MASS INDEX: 27.62 KG/M2 | DIASTOLIC BLOOD PRESSURE: 75 MMHG | WEIGHT: 187 LBS

## 2023-06-15 DIAGNOSIS — O35.BXX0 VENTRICULAR SEPTAL DEFECT (VSD) OF FETUS AFFECTING MANAGEMENT OF PREGNANCY: ICD-10-CM

## 2023-06-15 DIAGNOSIS — O35.EXX0 PYELECTASIS OF FETUS ON PRENATAL ULTRASOUND: ICD-10-CM

## 2023-06-15 PROCEDURE — 3074F SYST BP LT 130 MM HG: CPT | Performed by: OBSTETRICS & GYNECOLOGY

## 2023-06-15 PROCEDURE — 3078F DIAST BP <80 MM HG: CPT | Performed by: OBSTETRICS & GYNECOLOGY

## 2023-06-15 PROCEDURE — 0502F SUBSEQUENT PRENATAL CARE: CPT | Performed by: OBSTETRICS & GYNECOLOGY

## 2023-06-15 ASSESSMENT — FIBROSIS 4 INDEX: FIB4 SCORE: 0.7

## 2023-06-16 ENCOUNTER — APPOINTMENT (OUTPATIENT)
Dept: ADMISSIONS | Facility: MEDICAL CENTER | Age: 33
End: 2023-06-16
Attending: OBSTETRICS & GYNECOLOGY
Payer: OTHER GOVERNMENT

## 2023-06-23 ENCOUNTER — PRE-ADMISSION TESTING (OUTPATIENT)
Dept: ADMISSIONS | Facility: MEDICAL CENTER | Age: 33
End: 2023-06-23
Attending: OBSTETRICS & GYNECOLOGY
Payer: OTHER GOVERNMENT

## 2023-07-14 ENCOUNTER — HOSPITAL ENCOUNTER (OUTPATIENT)
Facility: MEDICAL CENTER | Age: 33
End: 2023-07-14
Attending: OBSTETRICS & GYNECOLOGY
Payer: OTHER GOVERNMENT

## 2023-07-14 ENCOUNTER — ROUTINE PRENATAL (OUTPATIENT)
Dept: OBGYN | Facility: CLINIC | Age: 33
End: 2023-07-14
Payer: OTHER GOVERNMENT

## 2023-07-14 VITALS — DIASTOLIC BLOOD PRESSURE: 84 MMHG | WEIGHT: 191 LBS | BODY MASS INDEX: 28.21 KG/M2 | SYSTOLIC BLOOD PRESSURE: 134 MMHG

## 2023-07-14 DIAGNOSIS — Z34.83 ENCOUNTER FOR SUPERVISION OF OTHER NORMAL PREGNANCY IN THIRD TRIMESTER: ICD-10-CM

## 2023-07-14 DIAGNOSIS — Z98.891 HISTORY OF CESAREAN DELIVERY: ICD-10-CM

## 2023-07-14 PROCEDURE — 0502F SUBSEQUENT PRENATAL CARE: CPT | Performed by: OBSTETRICS & GYNECOLOGY

## 2023-07-14 PROCEDURE — 87150 DNA/RNA AMPLIFIED PROBE: CPT

## 2023-07-14 PROCEDURE — 3079F DIAST BP 80-89 MM HG: CPT | Performed by: OBSTETRICS & GYNECOLOGY

## 2023-07-14 PROCEDURE — 3075F SYST BP GE 130 - 139MM HG: CPT | Performed by: OBSTETRICS & GYNECOLOGY

## 2023-07-14 PROCEDURE — 87081 CULTURE SCREEN ONLY: CPT

## 2023-07-14 ASSESSMENT — FIBROSIS 4 INDEX: FIB4 SCORE: 0.7

## 2023-07-14 NOTE — LETTER
"Count Your Baby's Movements  Another step to a healthy delivery  Lina Johnson             Dept: [phone]    How Many Weeks Pregnant? Unknown    Date to Begin Countin2023              How to use this chart    One way for your physician to keep track of your baby's health is by knowing how often the baby moves (or \"kicks\") in your womb.  You can help your physician to do this by using this chart every day.    Every day, you should see how many hours it takes for your baby to move 10 times.  Start in the morning, as soon as you get up.    First, write down the time your baby moves until you get to 10.  Check off one box every time your baby moves until you get to 10.  Write down the time you finished counting in the last column.  Total how long it took to count up all 10 movements.  Finally, fill in the box that shows how long this took.  After counting 10 movements, you no longer have to count any more that day.  The next morning, just start counting again as soon as you get up.    What should you call a \"movement\"?  It is hard to say, because it will feel different from one mother to another and from one pregnancy to the next.  The important thing is that you count the movements the same way throughout your pregnancy.  If you have more questions, you should ask your physician.    Count carefully every day!  SAMPLE:  Week 28    How many hours did it take to feel 10 movements?       Start  Time     1     2     3     4     5     6     7     8     9     10   Finish Time   Mon 8:20           11:40                  Fri               Sat               Sun                 IMPORTANT: You should contact your physician if it takes more than two hours for you to feel 10 movements.  Each morning, write down the time and start to count the movements of your baby.  Keep track by checking off one box every time you feel one movement.  When you have felt 10 \"kicks\", write down " the time you finished counting in the last column.  Then fill in the   box (over the check jose) for the number of hours it took.  Be sure to read the complete instructions on the previous page.

## 2023-07-14 NOTE — PROGRESS NOTES
OB Visit Note - 38w1d     MEDICAL DECISION MAKING:  Lina Poon is a 32 y.o. female  at 38w1d    Today's visit addressed:   Denies CTX, VB or LOF. Good FM.     Pregnancy complicated by:  Patient Active Problem List   Diagnosis    Papillary thyroid carcinoma (HCC)    Postoperative hypothyroidism    Vitamin D deficiency    History of  delivery    Prenatal care, subsequent pregnancy    Leiomyoma of uterus affecting pregnancy in second trimester    VSD (ventricular septal defect)    Pyelectasis of fetus on prenatal ultrasound    Ventricular septal defect (VSD) of fetus affecting management of pregnancy     Risks of  section discussed with patient, including, but not limited to, bleeding, infection, damage to other organs such as bowel, bladder, ureters, and nerves, need for reoperation, injury to fetus, need for blood transfusion if indicated. Patient understands all risks and all questions answered. Consents to be signed.     NPO 8 h prior to OR discussed.     GBS collected.     The patient will follow up in 1 week(s) for her RCS. She was counseled to call or return for vaginal bleeding, regular contractions, leakage of fluid or decreased fetal movement.    Tamiko Owens M.D.

## 2023-07-15 LAB — GP B STREP DNA SPEC QL NAA+PROBE: NEGATIVE

## 2023-07-20 ENCOUNTER — ANESTHESIA EVENT (OUTPATIENT)
Dept: OBGYN | Facility: MEDICAL CENTER | Age: 33
End: 2023-07-20
Payer: OTHER GOVERNMENT

## 2023-07-20 ENCOUNTER — HOSPITAL ENCOUNTER (INPATIENT)
Facility: MEDICAL CENTER | Age: 33
LOS: 2 days | End: 2023-07-22
Attending: OBSTETRICS & GYNECOLOGY | Admitting: OBSTETRICS & GYNECOLOGY
Payer: OTHER GOVERNMENT

## 2023-07-20 ENCOUNTER — ANESTHESIA (OUTPATIENT)
Dept: OBGYN | Facility: MEDICAL CENTER | Age: 33
End: 2023-07-20
Payer: OTHER GOVERNMENT

## 2023-07-20 DIAGNOSIS — Z78.9 BREASTFEEDING (INFANT): Primary | ICD-10-CM

## 2023-07-20 LAB
BASOPHILS # BLD AUTO: 0.4 % (ref 0–1.8)
BASOPHILS # BLD: 0.04 K/UL (ref 0–0.12)
EOSINOPHIL # BLD AUTO: 0.07 K/UL (ref 0–0.51)
EOSINOPHIL NFR BLD: 0.7 % (ref 0–6.9)
ERYTHROCYTE [DISTWIDTH] IN BLOOD BY AUTOMATED COUNT: 42.5 FL (ref 35.9–50)
HCT VFR BLD AUTO: 42.1 % (ref 37–47)
HGB BLD-MCNC: 14.8 G/DL (ref 12–16)
HOLDING TUBE BB 8507: NORMAL
IMM GRANULOCYTES # BLD AUTO: 0.05 K/UL (ref 0–0.11)
IMM GRANULOCYTES NFR BLD AUTO: 0.5 % (ref 0–0.9)
LYMPHOCYTES # BLD AUTO: 2.15 K/UL (ref 1–4.8)
LYMPHOCYTES NFR BLD: 22.7 % (ref 22–41)
MCH RBC QN AUTO: 32.8 PG (ref 27–33)
MCHC RBC AUTO-ENTMCNC: 35.2 G/DL (ref 32.2–35.5)
MCV RBC AUTO: 93.3 FL (ref 81.4–97.8)
MONOCYTES # BLD AUTO: 0.65 K/UL (ref 0–0.85)
MONOCYTES NFR BLD AUTO: 6.8 % (ref 0–13.4)
NEUTROPHILS # BLD AUTO: 6.53 K/UL (ref 1.82–7.42)
NEUTROPHILS NFR BLD: 68.9 % (ref 44–72)
NRBC # BLD AUTO: 0 K/UL
NRBC BLD-RTO: 0 /100 WBC (ref 0–0.2)
PLATELET # BLD AUTO: 246 K/UL (ref 164–446)
PMV BLD AUTO: 10.4 FL (ref 9–12.9)
RBC # BLD AUTO: 4.51 M/UL (ref 4.2–5.4)
T PALLIDUM AB SER QL IA: NORMAL
WBC # BLD AUTO: 9.5 K/UL (ref 4.8–10.8)

## 2023-07-20 PROCEDURE — 160009 HCHG ANES TIME/MIN: Performed by: OBSTETRICS & GYNECOLOGY

## 2023-07-20 PROCEDURE — 770002 HCHG ROOM/CARE - OB PRIVATE (112)

## 2023-07-20 PROCEDURE — 36415 COLL VENOUS BLD VENIPUNCTURE: CPT

## 2023-07-20 PROCEDURE — 160035 HCHG PACU - 1ST 60 MINS PHASE I: Performed by: OBSTETRICS & GYNECOLOGY

## 2023-07-20 PROCEDURE — 59515 CESAREAN DELIVERY: CPT | Performed by: OBSTETRICS & GYNECOLOGY

## 2023-07-20 PROCEDURE — 160002 HCHG RECOVERY MINUTES (STAT): Performed by: OBSTETRICS & GYNECOLOGY

## 2023-07-20 PROCEDURE — 700102 HCHG RX REV CODE 250 W/ 637 OVERRIDE(OP): Performed by: ANESTHESIOLOGY

## 2023-07-20 PROCEDURE — 86780 TREPONEMA PALLIDUM: CPT

## 2023-07-20 PROCEDURE — 160041 HCHG SURGERY MINUTES - EA ADDL 1 MIN LEVEL 4: Performed by: OBSTETRICS & GYNECOLOGY

## 2023-07-20 PROCEDURE — 700105 HCHG RX REV CODE 258

## 2023-07-20 PROCEDURE — 85025 COMPLETE CBC W/AUTO DIFF WBC: CPT

## 2023-07-20 PROCEDURE — 700105 HCHG RX REV CODE 258: Performed by: ANESTHESIOLOGY

## 2023-07-20 PROCEDURE — A9270 NON-COVERED ITEM OR SERVICE: HCPCS | Performed by: ANESTHESIOLOGY

## 2023-07-20 PROCEDURE — 700105 HCHG RX REV CODE 258: Mod: JZ | Performed by: OBSTETRICS & GYNECOLOGY

## 2023-07-20 PROCEDURE — 700111 HCHG RX REV CODE 636 W/ 250 OVERRIDE (IP): Performed by: OBSTETRICS & GYNECOLOGY

## 2023-07-20 PROCEDURE — C1755 CATHETER, INTRASPINAL: HCPCS | Performed by: OBSTETRICS & GYNECOLOGY

## 2023-07-20 PROCEDURE — 700111 HCHG RX REV CODE 636 W/ 250 OVERRIDE (IP): Mod: JZ | Performed by: ANESTHESIOLOGY

## 2023-07-20 PROCEDURE — 160048 HCHG OR STATISTICAL LEVEL 1-5: Performed by: OBSTETRICS & GYNECOLOGY

## 2023-07-20 PROCEDURE — 160029 HCHG SURGERY MINUTES - 1ST 30 MINS LEVEL 4: Performed by: OBSTETRICS & GYNECOLOGY

## 2023-07-20 PROCEDURE — 700111 HCHG RX REV CODE 636 W/ 250 OVERRIDE (IP)

## 2023-07-20 PROCEDURE — 700111 HCHG RX REV CODE 636 W/ 250 OVERRIDE (IP): Performed by: ANESTHESIOLOGY

## 2023-07-20 PROCEDURE — 01961 ANES CESAREAN DELIVERY ONLY: CPT | Performed by: ANESTHESIOLOGY

## 2023-07-20 PROCEDURE — 700101 HCHG RX REV CODE 250: Performed by: ANESTHESIOLOGY

## 2023-07-20 RX ORDER — CEFAZOLIN SODIUM 1 G/3ML
INJECTION, POWDER, FOR SOLUTION INTRAMUSCULAR; INTRAVENOUS PRN
Status: DISCONTINUED | OUTPATIENT
Start: 2023-07-20 | End: 2023-07-20 | Stop reason: SURG

## 2023-07-20 RX ORDER — SCOLOPAMINE TRANSDERMAL SYSTEM 1 MG/1
1 PATCH, EXTENDED RELEASE TRANSDERMAL
Status: DISCONTINUED | OUTPATIENT
Start: 2023-07-20 | End: 2023-07-22 | Stop reason: HOSPADM

## 2023-07-20 RX ORDER — METOPROLOL TARTRATE 1 MG/ML
1 INJECTION, SOLUTION INTRAVENOUS
Status: DISCONTINUED | OUTPATIENT
Start: 2023-07-20 | End: 2023-07-20 | Stop reason: HOSPADM

## 2023-07-20 RX ORDER — SODIUM CHLORIDE, SODIUM LACTATE, POTASSIUM CHLORIDE, CALCIUM CHLORIDE 600; 310; 30; 20 MG/100ML; MG/100ML; MG/100ML; MG/100ML
INJECTION, SOLUTION INTRAVENOUS CONTINUOUS
Status: DISCONTINUED | OUTPATIENT
Start: 2023-07-20 | End: 2023-07-20

## 2023-07-20 RX ORDER — SODIUM CHLORIDE, SODIUM LACTATE, POTASSIUM CHLORIDE, CALCIUM CHLORIDE 600; 310; 30; 20 MG/100ML; MG/100ML; MG/100ML; MG/100ML
INJECTION, SOLUTION INTRAVENOUS ONCE
Status: DISCONTINUED | OUTPATIENT
Start: 2023-07-20 | End: 2023-07-20

## 2023-07-20 RX ORDER — ONDANSETRON 2 MG/ML
INJECTION INTRAMUSCULAR; INTRAVENOUS PRN
Status: DISCONTINUED | OUTPATIENT
Start: 2023-07-20 | End: 2023-07-20 | Stop reason: SURG

## 2023-07-20 RX ORDER — DEXAMETHASONE SODIUM PHOSPHATE 4 MG/ML
INJECTION, SOLUTION INTRA-ARTICULAR; INTRALESIONAL; INTRAMUSCULAR; INTRAVENOUS; SOFT TISSUE PRN
Status: DISCONTINUED | OUTPATIENT
Start: 2023-07-20 | End: 2023-07-20 | Stop reason: SURG

## 2023-07-20 RX ORDER — METOCLOPRAMIDE HYDROCHLORIDE 5 MG/ML
10 INJECTION INTRAMUSCULAR; INTRAVENOUS ONCE
Status: COMPLETED | OUTPATIENT
Start: 2023-07-20 | End: 2023-07-20

## 2023-07-20 RX ORDER — EPHEDRINE SULFATE 50 MG/ML
5 INJECTION, SOLUTION INTRAVENOUS
Status: DISCONTINUED | OUTPATIENT
Start: 2023-07-20 | End: 2023-07-20 | Stop reason: HOSPADM

## 2023-07-20 RX ORDER — ONDANSETRON 2 MG/ML
4 INJECTION INTRAMUSCULAR; INTRAVENOUS
Status: DISCONTINUED | OUTPATIENT
Start: 2023-07-20 | End: 2023-07-20 | Stop reason: HOSPADM

## 2023-07-20 RX ORDER — HYDROMORPHONE HYDROCHLORIDE 1 MG/ML
0.1 INJECTION, SOLUTION INTRAMUSCULAR; INTRAVENOUS; SUBCUTANEOUS
Status: DISCONTINUED | OUTPATIENT
Start: 2023-07-20 | End: 2023-07-20 | Stop reason: HOSPADM

## 2023-07-20 RX ORDER — OXYCODONE HYDROCHLORIDE 5 MG/1
5 TABLET ORAL EVERY 4 HOURS PRN
Status: DISCONTINUED | OUTPATIENT
Start: 2023-07-21 | End: 2023-07-22 | Stop reason: HOSPADM

## 2023-07-20 RX ORDER — HYDROMORPHONE HYDROCHLORIDE 1 MG/ML
0.4 INJECTION, SOLUTION INTRAMUSCULAR; INTRAVENOUS; SUBCUTANEOUS
Status: ACTIVE | OUTPATIENT
Start: 2023-07-20 | End: 2023-07-21

## 2023-07-20 RX ORDER — ACETAMINOPHEN 500 MG
1000 TABLET ORAL EVERY 4 HOURS PRN
Status: DISCONTINUED | OUTPATIENT
Start: 2023-07-20 | End: 2023-07-20

## 2023-07-20 RX ORDER — OXYCODONE HYDROCHLORIDE 10 MG/1
10 TABLET ORAL EVERY 4 HOURS PRN
Status: ACTIVE | OUTPATIENT
Start: 2023-07-20 | End: 2023-07-21

## 2023-07-20 RX ORDER — HYDROMORPHONE HYDROCHLORIDE 1 MG/ML
0.4 INJECTION, SOLUTION INTRAMUSCULAR; INTRAVENOUS; SUBCUTANEOUS
Status: DISCONTINUED | OUTPATIENT
Start: 2023-07-20 | End: 2023-07-20 | Stop reason: HOSPADM

## 2023-07-20 RX ORDER — DIPHENHYDRAMINE HCL 25 MG
25 TABLET ORAL EVERY 6 HOURS PRN
Status: DISCONTINUED | OUTPATIENT
Start: 2023-07-21 | End: 2023-07-22 | Stop reason: HOSPADM

## 2023-07-20 RX ORDER — OXYCODONE HYDROCHLORIDE 5 MG/1
5 TABLET ORAL EVERY 4 HOURS PRN
Status: ACTIVE | OUTPATIENT
Start: 2023-07-20 | End: 2023-07-21

## 2023-07-20 RX ORDER — PHENYLEPHRINE HYDROCHLORIDE 10 MG/ML
INJECTION, SOLUTION INTRAMUSCULAR; INTRAVENOUS; SUBCUTANEOUS PRN
Status: DISCONTINUED | OUTPATIENT
Start: 2023-07-20 | End: 2023-07-20

## 2023-07-20 RX ORDER — DIPHENHYDRAMINE HYDROCHLORIDE 50 MG/ML
25 INJECTION INTRAMUSCULAR; INTRAVENOUS EVERY 6 HOURS PRN
Status: DISCONTINUED | OUTPATIENT
Start: 2023-07-21 | End: 2023-07-22 | Stop reason: HOSPADM

## 2023-07-20 RX ORDER — MORPHINE SULFATE 0.5 MG/ML
INJECTION, SOLUTION EPIDURAL; INTRATHECAL; INTRAVENOUS
Status: COMPLETED | OUTPATIENT
Start: 2023-07-20 | End: 2023-07-20

## 2023-07-20 RX ORDER — ONDANSETRON 2 MG/ML
4 INJECTION INTRAMUSCULAR; INTRAVENOUS EVERY 6 HOURS PRN
Status: DISCONTINUED | OUTPATIENT
Start: 2023-07-20 | End: 2023-07-22 | Stop reason: HOSPADM

## 2023-07-20 RX ORDER — CITRIC ACID/SODIUM CITRATE 334-500MG
30 SOLUTION, ORAL ORAL ONCE
Status: COMPLETED | OUTPATIENT
Start: 2023-07-20 | End: 2023-07-20

## 2023-07-20 RX ORDER — OXYCODONE HYDROCHLORIDE 10 MG/1
10 TABLET ORAL EVERY 4 HOURS PRN
Status: DISCONTINUED | OUTPATIENT
Start: 2023-07-21 | End: 2023-07-22 | Stop reason: HOSPADM

## 2023-07-20 RX ORDER — LABETALOL HYDROCHLORIDE 5 MG/ML
5 INJECTION, SOLUTION INTRAVENOUS
Status: DISCONTINUED | OUTPATIENT
Start: 2023-07-20 | End: 2023-07-20 | Stop reason: HOSPADM

## 2023-07-20 RX ORDER — HYDROMORPHONE HYDROCHLORIDE 1 MG/ML
0.2 INJECTION, SOLUTION INTRAMUSCULAR; INTRAVENOUS; SUBCUTANEOUS
Status: DISCONTINUED | OUTPATIENT
Start: 2023-07-20 | End: 2023-07-20 | Stop reason: HOSPADM

## 2023-07-20 RX ORDER — ACETAMINOPHEN 500 MG
1000 TABLET ORAL EVERY 6 HOURS
Status: DISPENSED | OUTPATIENT
Start: 2023-07-20 | End: 2023-07-21

## 2023-07-20 RX ORDER — MEPERIDINE HYDROCHLORIDE 25 MG/ML
12.5 INJECTION INTRAMUSCULAR; INTRAVENOUS; SUBCUTANEOUS
Status: DISCONTINUED | OUTPATIENT
Start: 2023-07-20 | End: 2023-07-20 | Stop reason: HOSPADM

## 2023-07-20 RX ORDER — OXYCODONE HCL 5 MG/5 ML
5 SOLUTION, ORAL ORAL
Status: DISCONTINUED | OUTPATIENT
Start: 2023-07-20 | End: 2023-07-20 | Stop reason: HOSPADM

## 2023-07-20 RX ORDER — HALOPERIDOL 5 MG/ML
1 INJECTION INTRAMUSCULAR
Status: DISCONTINUED | OUTPATIENT
Start: 2023-07-20 | End: 2023-07-20 | Stop reason: HOSPADM

## 2023-07-20 RX ORDER — IBUPROFEN 800 MG/1
800 TABLET ORAL EVERY 8 HOURS PRN
Status: DISCONTINUED | OUTPATIENT
Start: 2023-07-24 | End: 2023-07-22 | Stop reason: HOSPADM

## 2023-07-20 RX ORDER — OXYCODONE HCL 5 MG/5 ML
10 SOLUTION, ORAL ORAL
Status: DISCONTINUED | OUTPATIENT
Start: 2023-07-20 | End: 2023-07-20 | Stop reason: HOSPADM

## 2023-07-20 RX ORDER — DIPHENHYDRAMINE HYDROCHLORIDE 50 MG/ML
12.5 INJECTION INTRAMUSCULAR; INTRAVENOUS
Status: DISCONTINUED | OUTPATIENT
Start: 2023-07-20 | End: 2023-07-20 | Stop reason: HOSPADM

## 2023-07-20 RX ORDER — ACETAMINOPHEN 500 MG
1000 TABLET ORAL EVERY 6 HOURS
Status: DISCONTINUED | OUTPATIENT
Start: 2023-07-21 | End: 2023-07-22 | Stop reason: HOSPADM

## 2023-07-20 RX ORDER — LEVOTHYROXINE SODIUM 0.15 MG/1
150 TABLET ORAL
Status: DISCONTINUED | OUTPATIENT
Start: 2023-07-21 | End: 2023-07-22 | Stop reason: HOSPADM

## 2023-07-20 RX ORDER — CEFAZOLIN SODIUM 1 G/3ML
2 INJECTION, POWDER, FOR SOLUTION INTRAMUSCULAR; INTRAVENOUS ONCE
Status: DISCONTINUED | OUTPATIENT
Start: 2023-07-20 | End: 2023-07-20 | Stop reason: HOSPADM

## 2023-07-20 RX ORDER — OXYTOCIN 10 [USP'U]/ML
10 INJECTION, SOLUTION INTRAMUSCULAR; INTRAVENOUS
Status: DISCONTINUED | OUTPATIENT
Start: 2023-07-20 | End: 2023-07-20

## 2023-07-20 RX ORDER — SODIUM CHLORIDE, SODIUM GLUCONATE, SODIUM ACETATE, POTASSIUM CHLORIDE AND MAGNESIUM CHLORIDE 526; 502; 368; 37; 30 MG/100ML; MG/100ML; MG/100ML; MG/100ML; MG/100ML
1000 INJECTION, SOLUTION INTRAVENOUS ONCE
Status: COMPLETED | OUTPATIENT
Start: 2023-07-20 | End: 2023-07-20

## 2023-07-20 RX ORDER — KETOROLAC TROMETHAMINE 30 MG/ML
INJECTION, SOLUTION INTRAMUSCULAR; INTRAVENOUS PRN
Status: DISCONTINUED | OUTPATIENT
Start: 2023-07-20 | End: 2023-07-20 | Stop reason: SURG

## 2023-07-20 RX ORDER — KETOROLAC TROMETHAMINE 30 MG/ML
30 INJECTION, SOLUTION INTRAMUSCULAR; INTRAVENOUS EVERY 6 HOURS
Status: COMPLETED | OUTPATIENT
Start: 2023-07-20 | End: 2023-07-21

## 2023-07-20 RX ORDER — ONDANSETRON 4 MG/1
4 TABLET, ORALLY DISINTEGRATING ORAL EVERY 6 HOURS PRN
Status: DISCONTINUED | OUTPATIENT
Start: 2023-07-20 | End: 2023-07-22 | Stop reason: HOSPADM

## 2023-07-20 RX ORDER — ACETAMINOPHEN 500 MG
1000 TABLET ORAL EVERY 6 HOURS PRN
Status: DISCONTINUED | OUTPATIENT
Start: 2023-07-24 | End: 2023-07-22 | Stop reason: HOSPADM

## 2023-07-20 RX ORDER — HYDROMORPHONE HYDROCHLORIDE 1 MG/ML
0.2 INJECTION, SOLUTION INTRAMUSCULAR; INTRAVENOUS; SUBCUTANEOUS
Status: ACTIVE | OUTPATIENT
Start: 2023-07-20 | End: 2023-07-21

## 2023-07-20 RX ORDER — SODIUM CHLORIDE, SODIUM LACTATE, POTASSIUM CHLORIDE, CALCIUM CHLORIDE 600; 310; 30; 20 MG/100ML; MG/100ML; MG/100ML; MG/100ML
INJECTION, SOLUTION INTRAVENOUS PRN
Status: DISCONTINUED | OUTPATIENT
Start: 2023-07-20 | End: 2023-07-22 | Stop reason: HOSPADM

## 2023-07-20 RX ORDER — IBUPROFEN 800 MG/1
800 TABLET ORAL EVERY 8 HOURS
Status: DISCONTINUED | OUTPATIENT
Start: 2023-07-21 | End: 2023-07-22 | Stop reason: HOSPADM

## 2023-07-20 RX ORDER — PHENYLEPHRINE HYDROCHLORIDE 10 MG/ML
INJECTION, SOLUTION INTRAMUSCULAR; INTRAVENOUS; SUBCUTANEOUS PRN
Status: DISCONTINUED | OUTPATIENT
Start: 2023-07-20 | End: 2023-07-20 | Stop reason: SURG

## 2023-07-20 RX ORDER — BUPIVACAINE HYDROCHLORIDE 7.5 MG/ML
INJECTION, SOLUTION INTRASPINAL
Status: COMPLETED | OUTPATIENT
Start: 2023-07-20 | End: 2023-07-20

## 2023-07-20 RX ORDER — CEFAZOLIN SODIUM 1 G/3ML
2 INJECTION, POWDER, FOR SOLUTION INTRAMUSCULAR; INTRAVENOUS ONCE
Status: DISCONTINUED | OUTPATIENT
Start: 2023-07-20 | End: 2023-07-20

## 2023-07-20 RX ORDER — DOCUSATE SODIUM 100 MG/1
100 CAPSULE, LIQUID FILLED ORAL 2 TIMES DAILY PRN
Status: DISCONTINUED | OUTPATIENT
Start: 2023-07-20 | End: 2023-07-22 | Stop reason: HOSPADM

## 2023-07-20 RX ORDER — SODIUM CHLORIDE, SODIUM GLUCONATE, SODIUM ACETATE, POTASSIUM CHLORIDE AND MAGNESIUM CHLORIDE 526; 502; 368; 37; 30 MG/100ML; MG/100ML; MG/100ML; MG/100ML; MG/100ML
1500 INJECTION, SOLUTION INTRAVENOUS ONCE
Status: DISCONTINUED | OUTPATIENT
Start: 2023-07-20 | End: 2023-07-20

## 2023-07-20 RX ORDER — SODIUM CHLORIDE, SODIUM GLUCONATE, SODIUM ACETATE, POTASSIUM CHLORIDE AND MAGNESIUM CHLORIDE 526; 502; 368; 37; 30 MG/100ML; MG/100ML; MG/100ML; MG/100ML; MG/100ML
INJECTION, SOLUTION INTRAVENOUS
Status: DISCONTINUED | OUTPATIENT
Start: 2023-07-20 | End: 2023-07-20 | Stop reason: SURG

## 2023-07-20 RX ADMIN — PHENYLEPHRINE HYDROCHLORIDE 200 MCG: 10 INJECTION INTRAVENOUS at 10:53

## 2023-07-20 RX ADMIN — FAMOTIDINE 20 MG: 10 INJECTION, SOLUTION INTRAVENOUS at 09:32

## 2023-07-20 RX ADMIN — SODIUM CHLORIDE, SODIUM GLUCONATE, SODIUM ACETATE, POTASSIUM CHLORIDE AND MAGNESIUM CHLORIDE 1000 ML: 526; 502; 368; 37; 30 INJECTION, SOLUTION INTRAVENOUS at 09:30

## 2023-07-20 RX ADMIN — CEFAZOLIN 2 G: 1 INJECTION, POWDER, FOR SOLUTION INTRAMUSCULAR; INTRAVENOUS at 10:21

## 2023-07-20 RX ADMIN — SODIUM CHLORIDE 0.5 MCG/KG/MIN: 900 INJECTION INTRAVENOUS at 10:10

## 2023-07-20 RX ADMIN — SODIUM CHLORIDE, SODIUM GLUCONATE, SODIUM ACETATE, POTASSIUM CHLORIDE AND MAGNESIUM CHLORIDE: 526; 502; 368; 37; 30 INJECTION, SOLUTION INTRAVENOUS at 10:03

## 2023-07-20 RX ADMIN — KETOROLAC TROMETHAMINE 30 MG: 30 INJECTION, SOLUTION INTRAMUSCULAR; INTRAVENOUS at 10:58

## 2023-07-20 RX ADMIN — ACETAMINOPHEN 1000 MG: 500 TABLET, FILM COATED ORAL at 20:19

## 2023-07-20 RX ADMIN — MORPHINE SULFATE 850 MCG: 0.5 INJECTION, SOLUTION EPIDURAL; INTRATHECAL; INTRAVENOUS at 11:17

## 2023-07-20 RX ADMIN — MORPHINE SULFATE 150 MCG: 0.5 INJECTION, SOLUTION EPIDURAL; INTRATHECAL; INTRAVENOUS at 10:11

## 2023-07-20 RX ADMIN — OXYTOCIN 125 ML/HR: 10 INJECTION, SOLUTION INTRAMUSCULAR; INTRAVENOUS at 13:15

## 2023-07-20 RX ADMIN — BUPIVACAINE HYDROCHLORIDE IN DEXTROSE 1.6 ML: 7.5 INJECTION, SOLUTION SUBARACHNOID at 10:11

## 2023-07-20 RX ADMIN — PHENYLEPHRINE HYDROCHLORIDE 200 MCG: 10 INJECTION INTRAVENOUS at 10:22

## 2023-07-20 RX ADMIN — DEXAMETHASONE SODIUM PHOSPHATE 4 MG: 4 INJECTION INTRA-ARTICULAR; INTRALESIONAL; INTRAMUSCULAR; INTRAVENOUS; SOFT TISSUE at 10:50

## 2023-07-20 RX ADMIN — SODIUM CITRATE AND CITRIC ACID MONOHYDRATE 30 ML: 500; 334 SOLUTION ORAL at 09:32

## 2023-07-20 RX ADMIN — SODIUM CHLORIDE, POTASSIUM CHLORIDE, SODIUM LACTATE AND CALCIUM CHLORIDE: 600; 310; 30; 20 INJECTION, SOLUTION INTRAVENOUS at 17:40

## 2023-07-20 RX ADMIN — ONDANSETRON 4 MG: 2 INJECTION INTRAMUSCULAR; INTRAVENOUS at 14:38

## 2023-07-20 RX ADMIN — OXYTOCIN 20 UNITS: 10 INJECTION, SOLUTION INTRAMUSCULAR; INTRAVENOUS at 10:47

## 2023-07-20 RX ADMIN — ONDANSETRON 4 MG: 2 INJECTION INTRAMUSCULAR; INTRAVENOUS at 10:50

## 2023-07-20 RX ADMIN — KETOROLAC TROMETHAMINE 30 MG: 30 INJECTION, SOLUTION INTRAMUSCULAR; INTRAVENOUS at 16:55

## 2023-07-20 RX ADMIN — OXYTOCIN 10 UNITS: 10 INJECTION, SOLUTION INTRAMUSCULAR; INTRAVENOUS at 11:48

## 2023-07-20 RX ADMIN — ONDANSETRON 4 MG: 2 INJECTION INTRAMUSCULAR; INTRAVENOUS at 20:20

## 2023-07-20 RX ADMIN — SCOPOLAMINE 1 PATCH: 1.5 PATCH, EXTENDED RELEASE TRANSDERMAL at 15:43

## 2023-07-20 RX ADMIN — KETOROLAC TROMETHAMINE 30 MG: 30 INJECTION, SOLUTION INTRAMUSCULAR; INTRAVENOUS at 23:30

## 2023-07-20 RX ADMIN — METOCLOPRAMIDE 10 MG: 5 INJECTION, SOLUTION INTRAMUSCULAR; INTRAVENOUS at 09:32

## 2023-07-20 ASSESSMENT — PAIN DESCRIPTION - PAIN TYPE
TYPE: ACUTE PAIN;SURGICAL PAIN

## 2023-07-20 ASSESSMENT — EDINBURGH POSTNATAL DEPRESSION SCALE (EPDS)
I HAVE FELT SAD OR MISERABLE: NO, NOT AT ALL
I HAVE BEEN ABLE TO LAUGH AND SEE THE FUNNY SIDE OF THINGS: AS MUCH AS I ALWAYS COULD
I HAVE LOOKED FORWARD WITH ENJOYMENT TO THINGS: AS MUCH AS I EVER DID
I HAVE BEEN SO UNHAPPY THAT I HAVE BEEN CRYING: NO, NEVER
THINGS HAVE BEEN GETTING ON TOP OF ME: NO, MOST OF THE TIME I HAVE COPED QUITE WELL
I HAVE BEEN SO UNHAPPY THAT I HAVE HAD DIFFICULTY SLEEPING: NOT AT ALL
I HAVE BLAMED MYSELF UNNECESSARILY WHEN THINGS WENT WRONG: NO, NEVER
I HAVE FELT SCARED OR PANICKY FOR NO GOOD REASON: NO, NOT AT ALL
I HAVE BEEN ANXIOUS OR WORRIED FOR NO GOOD REASON: NO, NOT AT ALL
THE THOUGHT OF HARMING MYSELF HAS OCCURRED TO ME: NEVER

## 2023-07-20 ASSESSMENT — PATIENT HEALTH QUESTIONNAIRE - PHQ9
2. FEELING DOWN, DEPRESSED, IRRITABLE, OR HOPELESS: NOT AT ALL
1. LITTLE INTEREST OR PLEASURE IN DOING THINGS: NOT AT ALL
SUM OF ALL RESPONSES TO PHQ9 QUESTIONS 1 AND 2: 0

## 2023-07-20 ASSESSMENT — FIBROSIS 4 INDEX: FIB4 SCORE: 0.7

## 2023-07-20 ASSESSMENT — LIFESTYLE VARIABLES
DOES PATIENT WANT TO STOP DRINKING: NO
TOTAL SCORE: 0
HAVE PEOPLE ANNOYED YOU BY CRITICIZING YOUR DRINKING: NO
HAVE YOU EVER FELT YOU SHOULD CUT DOWN ON YOUR DRINKING: NO
EVER FELT BAD OR GUILTY ABOUT YOUR DRINKING: NO
TOTAL SCORE: 0
TOTAL SCORE: 0
CONSUMPTION TOTAL: INCOMPLETE
ALCOHOL_USE: NO
EVER HAD A DRINK FIRST THING IN THE MORNING TO STEADY YOUR NERVES TO GET RID OF A HANGOVER: NO

## 2023-07-20 ASSESSMENT — PAIN SCALES - GENERAL
PAINLEVEL: 0 - NO PAIN
PAIN_LEVEL: 0

## 2023-07-20 NOTE — LACTATION NOTE
This note was copied from a baby's chart.  Initial Consult:     History:   R C/S at  39+0weeks.  Maternal hx of thyroid carcinoma, now hypothyroid.       History of BF:  first child who is now 2years old.  Had difficult time with latch initially     Report of Current Breastfeeding Status: MOB reports baby latched in PACU without difficulty.     Breastfeeding Assistance:   Basic breastfeeding information education given to include feeding baby with feeding cues at least a minimum of 8x/24 hours.  It is not recommended to let baby sleep longer than 4 hours between feedings and if sleepy, place skin to skin to promote feeding interest and milk production.   Taught hand expression and recommend to hand express colostrum onto baby's upper lip/mouth when beginning a feed.  Discussed recognition of early feeding cues and importance of deep latch.      Provided breastfeeding education on: hunger cues, frequency/duration of breastfeeds, skin to skin, hand expression and recommended to watch [place] videos.      Plan: Continue to offer infant the breast per feeding cues for a minimum of 8 or more feeds in a 24 hour period.  If baby too sleepy to latch, hand express colostrum directly into baby's mouth every 3-4hrs. Frequent skin to skin as MOB awake and attentive.

## 2023-07-20 NOTE — OP REPORT
Operative Report -     Patient: Lina Poon  MRN: [id number]         Date of Surgery: 2023    Pre-operative Diagnosis:   1. IUP at 39w0d  2. History of prior  section  Post-operative Diagnosis: Same as above  Procedure: Repeat Low Transverse  Section via Pfannenstiel incision  Surgeon(s): Tamiko Owens MD  Assistant(s): Sweta Campoverde MD  Anesthesia: Spinal  Findings: LV female infant, Apgars 8/9, weight 3118 g; normal uterus, tubes and ovaries bilaterally. Uterus with several pedunculated fibroids measuring 3-4 cm in greatest dimension at fundus.   Complications: none  Specimens: none  UOP: 100 mL  EBL: 700 mL    Indications:   Pt is a 32 y.o.  at 39w0d here for repeat . The risks, benefits and alternatives of  section were discussed with the patient, including but not limited to infection, severe loss of blood, injury to bowel or bladder, injury to blood vessels, hysterectomy, injury to fetus, possible need for transfusion, pelvic pain, adhesive disease or scar tissue and risk of repeat cesareans vs. trial of labor after .  All questions were answered and patient consented to the procedure.      Procedure:  The patient was taken to the operating room where spinal anesthesia was placed and found to be adequate. She was prepped and draped in the normal sterile fashion in the dorsal supine position. Correct time-out was performed. Preop antibiotics of ancef 2g IV were administered.     A Pfanenstiel skin incision was made with the scalpel and carried through to the underlying layer of fascia.  The fascia was then incised in the midline and the incision was extended laterally. The rectus muscles were  and the peritoneum entered. The peritoneal incision was then extended superiorly and inferiorly with good visualization of the bladder.    The  vesicouterine peritoneum identified, grasped with pick ups and entered sharply with Metzenbaum  scissors.  The incision was extended laterally and the bladder flap created digitally.  A medium Matt O retractor was placed at the surgical field.  The lower uterine segment incised in a low transverse fashion with the scalpel. The uterine incision was extended bluntly with bilateral traction.    Fetus was in vertex position. The infant was delivered atraumatically; the nose and mouth were suctioned and the cord was clamped and cut. The infant was handed off to the waiting staff. The placenta was removed with gentle traction on the cord.  The uterus was exteriorized and cleared of all clots and debris.  The uterine incision was repaired with 0-vicryl in running-locked fashion. There were 3-4 pedunculated smooth uterine fibroids measuring 3-4 cm in greatest dimension at the fundus. Good hemostasis was observed.     The gutters were cleared of all clots and debris using copious irrigation. The uterus was then re-inspected to ensure hemostasis as were all subfascial tissues. Peritoneum was approximated with 2-0 vicryl in a running fashion vertically. The fascia was re-approximated with 0-vicryl in a running fashion. The skin was closed with 4-0 monocryl.    The patient tolerated the procedure well. Sponge, lap and needle counts were correct times three. The patient was taken to recovery in stable condition. Total blood loss was 700 ml.     Tamiko Owens M.D.

## 2023-07-20 NOTE — ANESTHESIA POSTPROCEDURE EVALUATION
Patient: Lina Poon    Procedure Summary     Date: 23 Room / Location: [facility] OR 01 / SURGERY LABOR AND DELIVERY    Anesthesia Start: 1003 Anesthesia Stop: 1131    Procedure: REPEAT  SECTION Diagnosis:       S/P       (PREVIOUS  SECTION- 39 WEEKS)    Surgeons: Tamiko Owens M.D. Responsible Provider: Ton Balderas M.D.    Anesthesia Type: spinal ASA Status: 2          Final Anesthesia Type: spinal  Last vitals  BP   Blood Pressure: 120/88    Temp   36.2 °C (97.2 °F)    Pulse   99   Resp   16    SpO2   96 %      Anesthesia Post Evaluation    Patient location during evaluation: PACU  Patient participation: complete - patient participated  Level of consciousness: awake and alert  Pain score: 0    Airway patency: patent  Anesthetic complications: no  Cardiovascular status: hemodynamically stable  Respiratory status: acceptable  Hydration status: euvolemic  Comments: Remains on phenyl ephrine in pacu which will be weaned off.     PONV: none          No notable events documented.

## 2023-07-20 NOTE — PROGRESS NOTES
1300- patient assessment done.  Patient oriented to room and call light.  Condition will continue to be monitored.

## 2023-07-20 NOTE — ED TRIAGE NOTES
LABOR AND DELIVERY TRIAGE NOTE    PATIENT ID:  NAME:  Lina Poon  MRN:               [id number]  YOB: 1990     32 y.o. female  at 39w0d.    Subjective: Pt here for repeat  section.     Pregnancy complicated by   Hx prior  section x 1 at 37w for previa.   Fetal VSD, pyelectasis - plan for  echo and renal US  Hx thyroid carcinoma, now hypothyroid.   Uterine fibroids      PNL:  See H & P      ROS: Patient denies any fever chills, nausea, vomiting, headache, chest pain, shortness of breath, or dysuria or unusual swelling of hands or feet.     PMHx:   Past Medical History:   Diagnosis Date    Cancer (HCC)     Thyroid CA    Pregnant 2023    Thyroid disease         OB History    Para Term  AB Living   2 1 1     1   SAB IAB Ectopic Molar Multiple Live Births             1      # Outcome Date GA Lbr Ruslan/2nd Weight Sex Delivery Anes PTL Lv   2 Current            1 Term 05/10/21 37w0d  3.204 kg (7 lb 1 oz) F CS-LTranv Spinal N CHELSEY      Complications: Placenta Previa       GYN: denies STIs, no cervical procedures    PSHx:  Past Surgical History:   Procedure Laterality Date    TONSILLECTOMY  2013    NO PERTINENT PAST SURGICAL HISTORY  May 10,2021    C section    OTHER  2021    Thyroidectomy - left lobe       Social Hx:  Social History     Tobacco Use    Smoking status: Never     Passive exposure: Never    Smokeless tobacco: Never   Vaping Use    Vaping Use: Never used   Substance Use Topics    Alcohol use: Not Currently     Comment: occasional    Drug use: Never         Medications:  No current facility-administered medications on file prior to encounter.     Current Outpatient Medications on File Prior to Encounter   Medication Sig Dispense Refill    Prenatal MV-Min-Fe Fum-FA-DHA (PRENATAL 1 PO) Take  by mouth every day.         Allergies:  NKDA      Objective:    Vitals:    23 0750 23 0800   BP: 120/88    Pulse: 99    Resp: 16    Temp:  "36.2 °C (97.2 °F)    TempSrc: Temporal    SpO2: 96%    Weight:  86.6 kg (191 lb)   Height:  1.753 m (5' 9\")     Temp (24hrs), Av.2 °C (97.2 °F), Min:36.2 °C (97.2 °F), Max:36.2 °C (97.2 °F)    General: No acute distress, resting comfortably in bed.  HEENT: normocephalic, nontraumatic, PERRLA, EOMI  Cardiovascular: Heart RRR with no murmurs, rubs or gallops. Distal Pulses 2+  Respiratory: symmetric chest expansion, lungs CTAB, with no wheezes, rales, rhonci  Abdomen: gravid, nontender  Musculoskeletal: STAPLETON spontaneously  Neuro: non focal with no numbness, tingling or changes in sensation    SVE: Deferred  [place]: Quiet minutes;   EFM:    Baseline 130s              moderate Variability              Accels present              Decels none      Assessment: 32 y.o. female  at 39w0d presents for repeat  section.     Plan:     See H & P.        Tamiko Owens M.D.   "

## 2023-07-20 NOTE — ANESTHESIA PROCEDURE NOTES
Spinal Block    Date/Time: 7/20/2023 10:11 AM    Performed by: Ton Balderas M.D.  Authorized by: Ton Balderas M.D.    Start Time:  7/20/2023 10:11 AM  End Time:  7/20/2023 10:15 AM  Reason for Block: primary anesthetic    patient identified, IV checked, site marked, risks and benefits discussed, surgical consent, monitors and equipment checked, pre-op evaluation and timeout performed    Patient Position:  Sitting  Prep: ChloraPrep, patient draped and sterile technique    Monitoring:  Blood pressure, continuous pulse oximetry and heart rate  Approach:  Midline  Location:  L3-4  Injection Technique:  Single-shot  Skin infiltration:  Lidocaine  Strength:  1%  Dose:  3ml  Needle Type:  Pencan  Needle Gauge:  25 G  CSF flowing pre/post injection:  Yes  Sensory Level:  T4   One pass

## 2023-07-20 NOTE — ANESTHESIA TIME REPORT
Anesthesia Start and Stop Event Times     Date Time Event    7/20/2023 1003 Anesthesia Start     1131 Anesthesia Stop        Responsible Staff  07/20/23    Name Role Begin End    Ton Balderas M.D. Anesth 1003 1131        Overtime Reason:  no overtime (within assigned shift)    Comments:

## 2023-07-20 NOTE — L&D DELIVERY NOTE
Delivery Note    Obstetrician:   Tamiko Owens MD    Assistant: Sweta Campoverde MD    Pre-Delivery Diagnosis:   33 yo  @ 39 weeks intrauterine pregnancy  History of prior  section x 1    Post-Delivery Diagnosis: Living  infant(s) or Female    Procedure: Repeat  section     Episiotomy or Incision: Pfannensteil    Indications for instrumental delivery: none    Infant Wt: 3118 gm.  /   6lbs,  14oz.          Apgars: 1' 8  /  5' 9     Placenta and Cord:          Mechanism: spontaneous        Description:  complete, 3 vessel cord, membranes intact    Estimated Blood Loss:  700 ml           Total IV Fluids: 2000ml           Specimens: None           Complications:  none           Condition: stable    Blood Type and Rh: O pos, negative      Rubella Immunity Status:   Immune           Tamiko Owens M.D.    Obstetrics and Gynecology    2023 11:18 AM

## 2023-07-20 NOTE — CARE PLAN
The patient is Stable - Low risk of patient condition declining or worsening    Shift Goals  Clinical Goals: remain clinically stable    Progress made toward(s) clinical / shift goals:  Patient has scant to light lochia with a firm palpable uterus.  Vital signs are within defined limits.  Patient will ask for pain medication when needed.  Assessment will continue.     Patient is not progressing towards the following goals:

## 2023-07-20 NOTE — H&P
LABOR AND DELIVERY HISTORY AND PHYSICAL    PATIENT ID:  NAME:  Lina Poon  MRN:               [id number]  YOB: 1990    CC:  Presenting for repeat  section    HPI:  Lina Poon is a 32 y.o. female  at 39w0d by LMP c/w 11 week ultrasound with Estimated Date of Delivery: 23      Denies contractions, vaginal bleeding or leakage of fluid. Reports good fetal movement.     Prenatal care with [facility] c/b    Hx prior  section x 1 at 37w for previa.   Fetal VSD, pyelectasis - plan for  echo and renal US  Hx thyroid carcinoma, now hypothyroid.   Uterine fibroids    ROS: Patient denies any fever chills, nausea, vomiting, headache, chest pain, shortness of breath, or dysuria or unusual swelling of hands or feet.         Prenatal Labs:   HepBsAg: neg HIV: neg Rubella: Immune   RPR: Neg PAP: NILM GBS: neg   GC/CT: Neg/Neg O pos / ABS neg Quad Screen: Neg   1hr GTT: 87 3hr GTT: N/A HepC: Neg     No results for input(s): WBC, RBC, HEMOGLOBIN, HEMATOCRIT, MCV, MCH, RDW, PLATELETCT, MPV, NEUTSPOLYS, LYMPHOCYTES, MONOCYTES, EOSINOPHILS, BASOPHILS, RBCMORPHOLO in the last 72 hours.  No results for input(s): SODIUM, POTASSIUM, CHLORIDE, CO2, GLUCOSE, BUN, CPKTOTAL in the last 72 hours.           POB Hx:  OB History    Para Term  AB Living   2 1 1     1   SAB IAB Ectopic Molar Multiple Live Births             1      # Outcome Date GA Lbr Ruslan/2nd Weight Sex Delivery Anes PTL Lv   2 Current            1 Term 05/10/21 37w0d  3.204 kg (7 lb 1 oz) F CS-LTranv Spinal N CHELSEY      Complications: Placenta Previa       PMH/Problem List:    Past Medical History:   Diagnosis Date    Cancer (HCC)     Thyroid CA    Pregnant 2023    Thyroid disease        Current Outpatient Medications:  No current facility-administered medications on file prior to encounter.     Current Outpatient Medications on File Prior to Encounter   Medication Sig Dispense Refill    Prenatal  "MV-Min-Fe Fum-FA-DHA (PRENATAL 1 PO) Take  by mouth every day.         PSH:    Past Surgical History:   Procedure Laterality Date    TONSILLECTOMY  2013    NO PERTINENT PAST SURGICAL HISTORY  May 10,2021    C section    OTHER  2021    Thyroidectomy - left lobe       Allergies:   No Known Allergies    SH:  Social History     Socioeconomic History    Marital status:      Spouse name: Not on file    Number of children: Not on file    Years of education: Not on file    Highest education level: Not on file   Occupational History    Not on file   Tobacco Use    Smoking status: Never     Passive exposure: Never    Smokeless tobacco: Never   Vaping Use    Vaping Use: Never used   Substance and Sexual Activity    Alcohol use: Not Currently     Comment: occasional    Drug use: Never    Sexual activity: Yes     Partners: Male     Birth control/protection: None     Comment:  anfd planned pergnancy.   Other Topics Concern    Not on file   Social History Narrative    Not on file     Social Determinants of Health     Financial Resource Strain: Not on file   Food Insecurity: Not on file   Transportation Needs: Not on file   Physical Activity: Not on file   Stress: Not on file   Social Connections: Not on file   Intimate Partner Violence: Not on file   Housing Stability: Not on file         PHYSICAL EXAM:  Vitals:    23 0750 23 0800   BP: 120/88    Pulse: 99    Resp: 16    Temp: 36.2 °C (97.2 °F)    TempSrc: Temporal    SpO2: 96%    Weight:  86.6 kg (191 lb)   Height:  1.753 m (5' 9\")     Temp (24hrs), Av.2 °C (97.2 °F), Min:36.2 °C (97.2 °F), Max:36.2 °C (97.2 °F)    General: No acute distress, resting comfortably in bed.  HEENT: normocephalic, nontraumatic, PERRLA, EOMI  Cardiovascular: Heart RRR with no murmurs, rubs or gallops. Distal Pulses 2+  Respiratory: symmetric chest expansion, lungs CTA bilaterally with no wheezes rales or rhonci  Abdomen: gravid, nontender  Musculoskeletal: strength " 5/5 in four extremities  Neuro: non focal with no numbness, tingling or changes in sensation    SVE: Deferred  Eros: Quiet minutes;   EFM:  Baseline 130s   moderate Variability   Accels present   Decels none    A/P: Intrauterine pregancy at 39w0d weeks with history of prior  section presenting for repeat  section. Declined sterilization.     Risks of  section discussed with patient, including, but not limited to, bleeding, infection, damage to other organs such as bowel, bladder, ureters, and nerves, need for reoperation, injury to fetus, need for blood transfusion if indicated. Patient understands all risks and all questions answered. Consents to be signed.     Patient Active Problem List    Diagnosis Date Noted    Indication for care in labor or delivery 2023    Pyelectasis of fetus on prenatal ultrasound 06/15/2023    Ventricular septal defect (VSD) of fetus affecting management of pregnancy 06/15/2023    VSD (ventricular septal defect) 2023    Leiomyoma of uterus affecting pregnancy in second trimester 2023    Prenatal care, subsequent pregnancy 2023    History of  delivery 2023    Papillary thyroid carcinoma (HCC) 2022    Postoperative hypothyroidism 2022    Vitamin D deficiency 2022     Plan:    Admission  Labs ordered.   Consent on chart   NPO, IV fluids   Preop ancef 2g iV prior to start of procedure.   Notify anesthesia.   Notify NICU/peds.     To OR for repeat  section.     Tamiko Owens M.D.

## 2023-07-20 NOTE — PROGRESS NOTES
0740- patient arrived in LDA06. , 39/0 for repeat C/S. Patient denies LOF, bleeding, or CTX'S. Reports nausea this morning. Reports pos fetal movement. Discussed POC, completed all pre-op procedures. Consents signed, patient agrees to POC.  1001- Patient ambulatory to OR.  1043- delivery of vigorous female infant. 8/9 APGARS.   1123- transferred patient to PACU via slider board and gurney. Vitals stable, patient on phenylephrine drip at 1mcg/ kg/ hr. Orders from Dr. Balderas to turn down rate to 0.5mcg/kg/hr when BP is stable and turn off when patient spinal is wearing off.   1245- transferred patient to PP. Patient BP stable, turned off phenylephrine drip. Gave report to RADHA Norton

## 2023-07-20 NOTE — ANESTHESIA PREPROCEDURE EVALUATION
Case: [id number] Date/Time: 2315    Procedure: REPEAT  SECTION    Pre-op diagnosis: PREVIOUS  SECTION- 39 WEEKS    Location: [facility] OR 01 / SURGERY LABOR AND DELIVERY    Surgeons: Tamiko Owens M.D.          Relevant Problems   ENDO   (positive) Postoperative hypothyroidism       Physical Exam    Airway   Mallampati: II  TM distance: >3 FB  Neck ROM: full       Cardiovascular - normal exam  Rhythm: regular  Rate: normal  (-) murmur     Dental - normal exam           Pulmonary - normal exam  Breath sounds clear to auscultation     Abdominal    Neurological - normal exam                 Anesthesia Plan    ASA 2       Plan - spinal   Neuraxial block will be primary anesthetic                Postoperative Plan: Postoperative administration of opioids is intended.    Pertinent diagnostic labs and testing reviewed    Informed Consent:    Anesthetic plan and risks discussed with patient.

## 2023-07-21 PROBLEM — O35.EXX0 PYELECTASIS OF FETUS ON PRENATAL ULTRASOUND: Status: RESOLVED | Noted: 2023-06-15 | Resolved: 2023-07-21

## 2023-07-21 PROBLEM — Z34.80 PRENATAL CARE, SUBSEQUENT PREGNANCY: Status: RESOLVED | Noted: 2023-01-18 | Resolved: 2023-07-21

## 2023-07-21 PROBLEM — O35.BXX0 VENTRICULAR SEPTAL DEFECT (VSD) OF FETUS AFFECTING MANAGEMENT OF PREGNANCY: Status: RESOLVED | Noted: 2023-06-15 | Resolved: 2023-07-21

## 2023-07-21 PROBLEM — Q21.0 VSD (VENTRICULAR SEPTAL DEFECT): Status: RESOLVED | Noted: 2023-04-18 | Resolved: 2023-07-21

## 2023-07-21 LAB
ERYTHROCYTE [DISTWIDTH] IN BLOOD BY AUTOMATED COUNT: 44.3 FL (ref 35.9–50)
HCT VFR BLD AUTO: 36.4 % (ref 37–47)
HGB BLD-MCNC: 12.4 G/DL (ref 12–16)
MCH RBC QN AUTO: 33.2 PG (ref 27–33)
MCHC RBC AUTO-ENTMCNC: 34.1 G/DL (ref 32.2–35.5)
MCV RBC AUTO: 97.6 FL (ref 81.4–97.8)
PLATELET # BLD AUTO: 196 K/UL (ref 164–446)
PMV BLD AUTO: 10.6 FL (ref 9–12.9)
RBC # BLD AUTO: 3.73 M/UL (ref 4.2–5.4)
WBC # BLD AUTO: 13 K/UL (ref 4.8–10.8)

## 2023-07-21 PROCEDURE — 85027 COMPLETE CBC AUTOMATED: CPT

## 2023-07-21 PROCEDURE — A9270 NON-COVERED ITEM OR SERVICE: HCPCS | Performed by: ANESTHESIOLOGY

## 2023-07-21 PROCEDURE — 770002 HCHG ROOM/CARE - OB PRIVATE (112)

## 2023-07-21 PROCEDURE — 700102 HCHG RX REV CODE 250 W/ 637 OVERRIDE(OP): Performed by: ANESTHESIOLOGY

## 2023-07-21 PROCEDURE — 700111 HCHG RX REV CODE 636 W/ 250 OVERRIDE (IP): Mod: JZ | Performed by: ANESTHESIOLOGY

## 2023-07-21 PROCEDURE — 700102 HCHG RX REV CODE 250 W/ 637 OVERRIDE(OP): Performed by: OBSTETRICS & GYNECOLOGY

## 2023-07-21 PROCEDURE — 36415 COLL VENOUS BLD VENIPUNCTURE: CPT

## 2023-07-21 PROCEDURE — A9270 NON-COVERED ITEM OR SERVICE: HCPCS | Performed by: OBSTETRICS & GYNECOLOGY

## 2023-07-21 RX ADMIN — ACETAMINOPHEN 1000 MG: 500 TABLET, FILM COATED ORAL at 14:27

## 2023-07-21 RX ADMIN — LEVOTHYROXINE SODIUM 150 MCG: 0.15 TABLET ORAL at 06:12

## 2023-07-21 RX ADMIN — ACETAMINOPHEN 1000 MG: 500 TABLET, FILM COATED ORAL at 02:23

## 2023-07-21 RX ADMIN — ACETAMINOPHEN 1000 MG: 500 TABLET, FILM COATED ORAL at 20:01

## 2023-07-21 RX ADMIN — ACETAMINOPHEN 1000 MG: 500 TABLET, FILM COATED ORAL at 08:32

## 2023-07-21 RX ADMIN — DOCUSATE SODIUM 100 MG: 100 CAPSULE, LIQUID FILLED ORAL at 08:32

## 2023-07-21 RX ADMIN — KETOROLAC TROMETHAMINE 30 MG: 30 INJECTION, SOLUTION INTRAMUSCULAR; INTRAVENOUS at 12:25

## 2023-07-21 RX ADMIN — IBUPROFEN 800 MG: 800 TABLET, FILM COATED ORAL at 18:12

## 2023-07-21 RX ADMIN — KETOROLAC TROMETHAMINE 30 MG: 30 INJECTION, SOLUTION INTRAMUSCULAR; INTRAVENOUS at 05:54

## 2023-07-21 ASSESSMENT — PAIN DESCRIPTION - PAIN TYPE
TYPE: ACUTE PAIN;SURGICAL PAIN
TYPE: SURGICAL PAIN
TYPE: ACUTE PAIN;SURGICAL PAIN
TYPE: SURGICAL PAIN
TYPE: SURGICAL PAIN
TYPE: ACUTE PAIN;SURGICAL PAIN
TYPE: SURGICAL PAIN
TYPE: SURGICAL PAIN
TYPE: ACUTE PAIN;SURGICAL PAIN
TYPE: SURGICAL PAIN
TYPE: SURGICAL PAIN

## 2023-07-21 NOTE — CARE PLAN
Problem: Infection - Postpartum  Goal: Postpartum patient will be free of signs and symptoms of infection  Outcome: Progressing     Problem: Altered Physiologic Condition  Goal: Patient physiologically stable as evidenced by normal lochia, palpable uterine involution and vitals within normal limits  Outcome: Progressing     Problem: Early Mobilization - Post Surgery  Goal: Early mobilization post surgery  Outcome: Progressing     Problem: Pain - Standard  Goal: Alleviation of pain or a reduction in pain to the patient’s comfort goal  Outcome: Progressing     The patient is Watcher - Medium risk of patient condition declining or worsening    Shift Goals  Clinical Goals: fundus firm and lochia light, pain control    Progress made toward(s) clinical / shift goals:  Pain well controlled with non-narcotic analgesia, pt is able to ambulate and care for self and infant with assistance. Lochia remains light/scant without clots expressed, performing laquita care and pad changes as needed. No s/s infection noted on assessment, remains afebrile.     Patient is not progressing towards the following goals: NA

## 2023-07-21 NOTE — PROGRESS NOTES
[facility]  Postpartum Progress Note    Lina Poon is a 32 y.o. now  on post-op day #1 following repeat  delivery on 23 at 1043 due to prior .    Subjective:   Pt reports overall feeling well.   Patient is meeting postpartum milestones.   Pain is well controlled with oral pain medication.    Pt is ambulating independently.   Pt has voided spontaneously.  Pt has passed gas.  Pt has not had bowel movement.  Pt is tolerating oral intake, she initially had significant N/V after  requiring several medications, had poor PO intake. However yesterday evening symptoms improved and patient had normal dinner and intake overnight. Anticipate improved PO today.  Lochia is light.    Infant is feeding via breast, reports this is going well.    Nursing concerns: none    Review of Symptoms:  GEN: denies fever or chills  CV: denies chest pain or palpitations  RESP: denies cough or shortness of breath  ABD: denies abd pain, denies nausea/vomiting  SKIN: denies rash  : lochia is light    OBJECTIVE:  Physical Exam:  Patient Vitals for the past 24 hrs:   BP Temp Temp src Pulse Resp SpO2 Height Weight   23 0600 104/69 36 °C (96.8 °F) Temporal 63 18 94 % -- --   23 0225 90/61 36.4 °C (97.5 °F) Temporal 62 18 94 % -- --   23 0223 -- -- -- 64 16 94 % -- --   23 0100 -- -- -- 65 16 93 % -- --   23 0000 -- -- -- 63 16 92 % -- --   23 2300 -- -- -- 65 17 92 % -- --   23 2200 111/67 36.6 °C (97.8 °F) Temporal 72 18 93 % -- --   23 2100 -- -- -- 70 16 94 % -- --   23 2000 -- -- -- 75 18 93 % -- --   23 1900 -- -- -- 78 18 92 % -- --   23 1800 135/85 36.7 °C (98 °F) Temporal 69 18 93 % -- --   23 1700 -- -- -- 70 18 92 % -- --   23 1600 -- -- -- 69 18 92 % -- --   23 1500 -- -- -- 76 18 91 % -- --   23 1400 -- -- -- 78 18 96 % -- --   23 1328 139/89 36.6 °C (97.9 °F) Temporal 88 20 94 % -- --  "  07/20/23 1300 -- -- -- 78 18 93 % -- --   07/20/23 1240 113/80 -- -- 74 -- 96 % -- --   07/20/23 1225 112/68 -- -- 85 16 94 % -- --   07/20/23 1210 (!) 142/74 36.2 °C (97.1 °F) Temporal 64 16 94 % -- --   07/20/23 1155 (!) 142/73 -- -- 60 -- 95 % -- --   07/20/23 1140 (!) 142/66 -- -- 75 16 98 % -- --   07/20/23 1125 111/61 (!) 2.7 °C (36.9 °F) Temporal 100 16 96 % -- --   07/20/23 0800 -- -- -- -- -- -- 1.753 m (5' 9\") 86.6 kg (191 lb)   07/20/23 0750 120/88 36.2 °C (97.2 °F) Temporal 99 16 96 % -- --       Gen: NAD, Alert, conversant  CV: +S1S2, RRR, cap refill <2 sec, BLE edema 1+  Resp: CTAB, breathing comfortably on room air  Abd: soft, ND, appropriately tender to palpation, no rebound/guarding, +BS;   : fundus palpable below umbilicus, lochia is light  Incision: clean/dry/intact, dressing in place and dry appearing, pt wants to remove in the shower later  Ext: moving all extremities    Meds:   Current Facility-Administered Medications:     [COMPLETED] oxytocin (Pitocin) infusion bolus (for post delivery), 20 Units, Intravenous, Once, Infusion Ended Prior to Shift at 07/20/23 1900 **FOLLOWED BY** oxytocin (Pitocin) infusion (for post delivery), 125 mL/hr, Intravenous, Continuous, Tamiko Owens M.D., Infusion Ended Prior to Shift at 07/20/23 1900    acetaminophen (Tylenol) tablet 1,000 mg, 1,000 mg, Oral, Q6HR, Ton Balderas M.D., 1,000 mg at 07/21/23 0223    ketorolac (Toradol) injection 30 mg, 30 mg, Intravenous, Q6HR, Ton Balderas M.D., 30 mg at 07/21/23 0554    oxyCODONE immediate-release (Roxicodone) tablet 5 mg, 5 mg, Oral, Q4HRS PRN, Ton Balderas M.D.    oxyCODONE immediate release (Roxicodone) tablet 10 mg, 10 mg, Oral, Q4HRS PRN, Ton Balderas M.D.    HYDROmorphone (Dilaudid) injection 0.2 mg, 0.2 mg, Intravenous, Q2HRS PRN, Ton Balderas M.D.    HYDROmorphone (Dilaudid) injection 0.4 mg, 0.4 mg, Intravenous, Q2HRS PRN, Ton Balderas M.D.    lactated ringers infusion, , Intravenous, PRN, Tamiko Owens, " M.D., Stopped at 07/21/23 0203    ibuprofen (Motrin) tablet 800 mg, 800 mg, Oral, Q8HRS **FOLLOWED BY** [START ON 7/24/2023] ibuprofen (Motrin) tablet 800 mg, 800 mg, Oral, Q8HRS PRN, Tamiko Owens M.D.    acetaminophen (Tylenol) tablet 1,000 mg, 1,000 mg, Oral, Q6HRS **FOLLOWED BY** [START ON 7/24/2023] acetaminophen (Tylenol) tablet 1,000 mg, 1,000 mg, Oral, Q6HRS PRN, Tamiko Owens M.D.    oxyCODONE immediate-release (Roxicodone) tablet 5 mg, 5 mg, Oral, Q4HRS PRN, Tamiko Owens M.D.    oxyCODONE immediate release (Roxicodone) tablet 10 mg, 10 mg, Oral, Q4HRS PRN, Tamiko Owens M.D.    ondansetron (Zofran) syringe/vial injection 4 mg, 4 mg, Intravenous, Q6HRS PRN, 4 mg at 07/20/23 2020 **OR** ondansetron (Zofran ODT) dispertab 4 mg, 4 mg, Oral, Q6HRS PRN, Tamiko Owens M.D.    diphenhydrAMINE (Benadryl) tablet/capsule 25 mg, 25 mg, Oral, Q6HRS PRN **OR** diphenhydrAMINE (Benadryl) injection 25 mg, 25 mg, Intravenous, Q6HRS PRN, Tamiko Owens M.D.    docusate sodium (Colace) capsule 100 mg, 100 mg, Oral, BID PRN, Tamiko Owens M.D.    scopolamine (Transderm-Scop) patch 1 Patch, 1 Patch, Transdermal, Q72HRS, Ton Balderas M.D., 1 Patch at 07/20/23 1543    levothyroxine (Synthroid) tablet 150 mcg, 150 mcg, Oral, AM ES, Tamiko Owens M.D., 150 mcg at 07/21/23 0612    Lab:   Recent Results (from the past 48 hour(s))   Hold Blood Bank Specimen (Not Tested)    Collection Time: 07/20/23  8:10 AM   Result Value Ref Range    Holding Tube - Bb DONE    CBC with Differential    Collection Time: 07/20/23  8:10 AM   Result Value Ref Range    WBC 9.5 4.8 - 10.8 K/uL    RBC 4.51 4.20 - 5.40 M/uL    Hemoglobin 14.8 12.0 - 16.0 g/dL    Hematocrit 42.1 37.0 - 47.0 %    MCV 93.3 81.4 - 97.8 fL    MCH 32.8 27.0 - 33.0 pg    MCHC 35.2 32.2 - 35.5 g/dL    RDW 42.5 35.9 - 50.0 fL    Platelet Count 246 164 - 446 K/uL    MPV 10.4 9.0 - 12.9 fL    Neutrophils-Polys 68.90 44.00 - 72.00 %    Lymphocytes 22.70 22.00 - 41.00 %    Monocytes 6.80 0.00 - 13.40 %     Eosinophils 0.70 0.00 - 6.90 %    Basophils 0.40 0.00 - 1.80 %    Immature Granulocytes 0.50 0.00 - 0.90 %    Nucleated RBC 0.00 0.00 - 0.20 /100 WBC    Neutrophils (Absolute) 6.53 1.82 - 7.42 K/uL    Lymphs (Absolute) 2.15 1.00 - 4.80 K/uL    Monos (Absolute) 0.65 0.00 - 0.85 K/uL    Eos (Absolute) 0.07 0.00 - 0.51 K/uL    Baso (Absolute) 0.04 0.00 - 0.12 K/uL    Immature Granulocytes (abs) 0.05 0.00 - 0.11 K/uL    NRBC (Absolute) 0.00 K/uL   T.PALLIDUM AB STEVEN (Syphilis)    Collection Time: 23  8:10 AM   Result Value Ref Range    Syphilis, Treponemal Qual Non-Reactive Non-Reactive   CBC without differential- Once in AM regardless of delivery time    Collection Time: 23  5:32 AM   Result Value Ref Range    WBC 13.0 (H) 4.8 - 10.8 K/uL    RBC 3.73 (L) 4.20 - 5.40 M/uL    Hemoglobin 12.4 12.0 - 16.0 g/dL    Hematocrit 36.4 (L) 37.0 - 47.0 %    MCV 97.6 81.4 - 97.8 fL    MCH 33.2 (H) 27.0 - 33.0 pg    MCHC 34.1 32.2 - 35.5 g/dL    RDW 44.3 35.9 - 50.0 fL    Platelet Count 196 164 - 446 K/uL    MPV 10.6 9.0 - 12.9 fL         Intake/Output Summary (Last 24 hours) at 2023 0655  Last data filed at 2023 0600  Gross per 24 hour   Intake 3047.89 ml   Output 2550 ml   Net 497.89 ml       Assessment and Plan:   Lina Poon is a 32 y.o. now  on post-op day #1 following repeat  delivery due to prior .    #postpartum 1  - Doing well postpartum, meeting all postpartum milestones  - encouraged ambulation, cont routine postop care  - continue prenatal vitamins    #RLTCS  - remove dressing after 24 hours  - incision check in clinic in 1-2 weeks    #Infant delivered  - doing well, rooming in, good dyad with mom  - pt is feeding via breast  - infant VSD, infant pyelectasis    #uterine fibroids    #Acute blood loss anemia  - iron 325mg and ascorbic acid 500mg PO together every other day for six months postpartum    #Rh positive  #Rubella immune  #HIV NR    #contraception:  TBD    #ppx:   - Early ambulation    #h/o papillary thyroid cancer  #hypothyroid    Disposition: Anticipate discharge to home on POD 2 or 3    Sweta Guerra MD, MPH  [facility] / [facility]stetrics   [facility]

## 2023-07-21 NOTE — PROGRESS NOTES
0700: Report received from Noc shift RN. Assumed pt care. Call light within reach, bed is locked and in the lowest position.      0830: Assessment complete. Reinforced education on expected lochia color and amount and when to call RN. Reinforced safe sleep, skin to skin, bundling when in open crib, and feeding frequency/duration for infant. Requested to have pt call when infant latches next. Reviewed plan of care for the day.

## 2023-07-21 NOTE — PROGRESS NOTES
Pt assisted to dangle, stand, and ambulate to restroom. Tolerated well without dizziness, lightheadedness, or increased pain. Sabiha care/pad change performed and pt assisted back to bed into position of comfort. SCD's in place bilaterally and continuous pulse oximeter in use for dura checks, call light within reach.

## 2023-07-21 NOTE — PROGRESS NOTES
Dr. Owens updated on pt urine output 200ml over the course of 6hrs and history of N/V during dayshift with low po intake and MIVF infusing at 125ml/hr. Telephone order received to keep ramírez catheter in place until morning and continue to monitor urine output, notify MD if less than 30ml/hr.

## 2023-07-21 NOTE — LACTATION NOTE
This note was copied from a baby's chart.  Follow up:    MOB reports breastfeeding is going well, baby is sleepy, but latching approx every 3hrs.      MOB breastfeeding in cradle position upon entering room, desired assistance with latch at left breast.  Baby burped and spit up small amount of colostrum and fell asleep.  Positioned into football hold with supportive pillows, but unable to wake baby to latch on left side.  Offered to assist with next feed.  MOB to call out.     Discussed treatment and relief for breast engorgement including hand expression, reverse pressure softening, cool packs after breastfeeding/pumping.     Provided with [place] BF resources and [place] [facility] [place]     Plan:  Continue to BF based on early feeding cues at least 8x in 24hr period, wake baby to feed if asleep longer than 3.5hr.

## 2023-07-21 NOTE — PROGRESS NOTES
Bedside report received from crista RN. 12hr chart check complete, MAR and orders reviewed. Dura checks in progress. Reviewed pt home medication of synthroid 150mcg QAM and telephone order received from Dr. Owens to continue home medication.

## 2023-07-22 ENCOUNTER — PHARMACY VISIT (OUTPATIENT)
Dept: PHARMACY | Facility: MEDICAL CENTER | Age: 33
End: 2023-07-22
Payer: COMMERCIAL

## 2023-07-22 VITALS
TEMPERATURE: 96.9 F | HEART RATE: 66 BPM | WEIGHT: 191 LBS | OXYGEN SATURATION: 96 % | DIASTOLIC BLOOD PRESSURE: 68 MMHG | HEIGHT: 69 IN | BODY MASS INDEX: 28.29 KG/M2 | RESPIRATION RATE: 18 BRPM | SYSTOLIC BLOOD PRESSURE: 102 MMHG

## 2023-07-22 PROCEDURE — A9270 NON-COVERED ITEM OR SERVICE: HCPCS | Performed by: OBSTETRICS & GYNECOLOGY

## 2023-07-22 PROCEDURE — RXMED WILLOW AMBULATORY MEDICATION CHARGE: Performed by: OBSTETRICS & GYNECOLOGY

## 2023-07-22 PROCEDURE — 700102 HCHG RX REV CODE 250 W/ 637 OVERRIDE(OP): Performed by: OBSTETRICS & GYNECOLOGY

## 2023-07-22 RX ORDER — ACETAMINOPHEN 500 MG
500-1000 TABLET ORAL EVERY 6 HOURS PRN
Qty: 40 TABLET | Refills: 0 | Status: SHIPPED | OUTPATIENT
Start: 2023-07-22 | End: 2023-07-27 | Stop reason: CLARIF

## 2023-07-22 RX ORDER — OXYCODONE HYDROCHLORIDE 5 MG/1
5 TABLET ORAL EVERY 6 HOURS PRN
Qty: 15 TABLET | Refills: 0 | Status: SHIPPED | OUTPATIENT
Start: 2023-07-22 | End: 2023-07-27

## 2023-07-22 RX ORDER — IBUPROFEN 800 MG/1
800 TABLET ORAL EVERY 8 HOURS
Qty: 42 TABLET | Refills: 0 | Status: SHIPPED | OUTPATIENT
Start: 2023-07-22 | End: 2023-07-27 | Stop reason: CLARIF

## 2023-07-22 RX ADMIN — ACETAMINOPHEN 1000 MG: 500 TABLET, FILM COATED ORAL at 02:27

## 2023-07-22 RX ADMIN — ACETAMINOPHEN 1000 MG: 500 TABLET, FILM COATED ORAL at 14:41

## 2023-07-22 RX ADMIN — ACETAMINOPHEN 1000 MG: 500 TABLET, FILM COATED ORAL at 07:44

## 2023-07-22 RX ADMIN — LEVOTHYROXINE SODIUM 150 MCG: 0.15 TABLET ORAL at 05:48

## 2023-07-22 RX ADMIN — IBUPROFEN 800 MG: 800 TABLET, FILM COATED ORAL at 02:26

## 2023-07-22 RX ADMIN — OXYCODONE HYDROCHLORIDE 5 MG: 5 TABLET ORAL at 07:42

## 2023-07-22 RX ADMIN — IBUPROFEN 800 MG: 800 TABLET, FILM COATED ORAL at 12:13

## 2023-07-22 ASSESSMENT — PAIN DESCRIPTION - PAIN TYPE
TYPE: SURGICAL PAIN

## 2023-07-22 NOTE — LACTATION NOTE
"This note was copied from a baby's chart.  Met with parents this morning to evaluate infant feeding. They report that baby Luciana has been fussy at times. After dialogue it sounds as though baby has been very \"gassy\" and also spit up some fluid today. Baby was taken to nursery last night for an ultrasound and returned very fussy to parents room.    I discussed how this can be very normal for a  in the first day or two and that by keeping her near mom's chest, in an upright manner when not feeding, she will be able to observe Luciana and help to burp excess air from her stomach.    I explained also how skin to skin care can help parent to bond and keep baby warm so that she can conserve her energy for feedings. Anticipating baby need may a snack can keep her from going too long without practice latching.     Technique and latch angle were reviewed and demonstrated. Luciana settled down and nursed well when aided by breast massage to keep milk flowing for her.She latched and nursed both breasts.  "

## 2023-07-22 NOTE — PROGRESS NOTES
Assessment complete. Fundus firm, lochia light. VSS. Tolerating diet. Voiding without difficulty. Incision CDI, open to air. Pt states passing gas. Pain controlled with scheduled medications per MAR. Will offer pain medications as they become available. FOB at bedside, bonding with pt/baby. POC discussed with pt. Encouraged to call with needs. Call light in place.

## 2023-07-22 NOTE — CARE PLAN
The patient is Stable - Low risk of patient condition declining or worsening    Shift Goals  Clinical Goals: Lochia WNL, VSS, Breastfeeding      Problem: Knowledge Deficit - Postpartum  Goal: Patient will verbalize and demonstrate understanding of self and infant care  7/21/2023 1930 by Beti Mallory R.N.  Outcome: Progressing  Note: Reinforced education on expected lochia color and amount. Reinforced education on skin to skin, safe sleep, bundling when in open crib, and feeding frequency and amount.     Problem: Early Mobilization - Post Surgery  Goal: Early mobilization post surgery  Note: Pt ambulating throughout room.

## 2023-07-22 NOTE — PROGRESS NOTES
0705: Report received from [facility] shift RN. Assumed pt care. Call light within reach, bed is locked and in the lowest position.      0744: Assessment complete. Reinforced education on expected lochia color and amount and when to call RN. Reinforced skin to skin, bundling when in open crib, safe sleep, and feeding frequency and duration for infant. RN assist with breastfeeding and latch, with no successful latch. Infant crying and not easily consolable. Pt reports difficulty throughout the night with latching and requested a hand pump. Assisted pt in pumping with hand pump and syringe feeding infant per their request. Will continue to assist with breastfeeding throughout the day.     1330: Pt using hand pump for comfort. She reports an increase in comfort after pumping and when infant latches. Pt requesting to go home today, message sent to Dr. Newman.    1519: Phone call with Dr. Newman about pt request to go home. She will work on orders for pt.     1715: Discharge instructions reviewed, pt verbalized understanding, papers signed. Prescribed medication and information given, verbalized understanding. Left facility escorted by staff.

## 2023-07-22 NOTE — LACTATION NOTE
Parents report that baby had a good feeding this afternoon and mother is feeling more confident with her lactation plan. Resources discussed for lactation support after discharge.

## 2023-07-22 NOTE — DISCHARGE SUMMARY
Discharge Summary:     Date of Admission: 2023  Date of Discharge: 23      Admitting diagnosis:    1. Pregnancy @ 39w0d  2. Scheduled LTCS for repeat      Discharge Diagnosis:   1. Status post  for repeat.    Past Medical History:   Diagnosis Date    Cancer (HCC)     Thyroid CA    Pregnant 2023    Thyroid disease      OB History    Para Term  AB Living   2 2 2     2   SAB IAB Ectopic Molar Multiple Live Births           0 2      # Outcome Date GA Lbr Ruslan/2nd Weight Sex Delivery Anes PTL Lv   2 Term 23 39w0d  3.11 kg (6 lb 13.7 oz) F CS-LTranv Spinal N CHELSEY   1 Term 05/10/21 37w0d  3.204 kg (7 lb 1 oz) F CS-LTranv Spinal N CHELSEY      Complications: Placenta Previa     Past Surgical History:   Procedure Laterality Date    REPEAT C SECTION N/A 2023    Procedure: REPEAT  SECTION;  Surgeon: Tamiko Owens M.D.;  Location: [facility];  Service: Labor and Delivery    TONSILLECTOMY      NO PERTINENT PAST SURGICAL HISTORY  May 10,2021    C section    OTHER  2021    Thyroidectomy - left lobe     Patient has no known allergies.    Patient Active Problem List   Diagnosis    Papillary thyroid carcinoma (HCC)    Postoperative hypothyroidism    Vitamin D deficiency    History of  delivery    Leiomyoma of uterus affecting pregnancy in second trimester    Indication for care in labor or delivery     delivery delivered       Hospital Course:   Pt is a 32 y.o. now  who presented on 2023 for  for repeat. Epidural anesthesia was utilized with good effect on pain. Single female infant was delivered via LTCS. Apgars 8, 9 at 1 and 5 minutes respectively.  ml.       Assessment/Plan  Lina Poon is a 32 y.o.yo  postpartum day #2  s/p  for repeat    #postpartum 1  - Doing well postpartum, meeting all postpartum milestones  - encouraged ambulation, cont routine postop care  - continue  prenatal vitamins    #RLTCS  - dressing has been removed, incision is clean and dry  - incision check in clinic in 1-2 weeks    #Infant delivered  - doing well, in room with mom and dad  - pt is feeding via breast  - infant VSD, infant pyelectasis    #uterine fibroids    #Rh positive  #Rubella immune  #HIV NR      Physical Exam:  Temp:  [36.1 °C (96.9 °F)-36.7 °C (98 °F)] 36.1 °C (96.9 °F)  Pulse:  [66-78] 66  Resp:  [16-18] 18  BP: (102-124)/(64-68) 102/68  SpO2:  [94 %-96 %] 96 %  Physical Exam  General: well appearing, no apparent distress  Abdomen: soft, nontender, nondistended  Fundus: firm at level below umbilicus  Incision: dressing clean, dry, intact  Perineum: Deferred  Extremities: symmetric, no peripheral edema, calves nontender    Current Facility-Administered Medications   Medication Dose    oxytocin (Pitocin) infusion (for post delivery)  125 mL/hr    lactated ringers infusion      ibuprofen (Motrin) tablet 800 mg  800 mg    Followed by    [START ON 7/24/2023] ibuprofen (Motrin) tablet 800 mg  800 mg    acetaminophen (Tylenol) tablet 1,000 mg  1,000 mg    Followed by    [START ON 7/24/2023] acetaminophen (Tylenol) tablet 1,000 mg  1,000 mg    oxyCODONE immediate-release (Roxicodone) tablet 5 mg  5 mg    oxyCODONE immediate release (Roxicodone) tablet 10 mg  10 mg    ondansetron (Zofran) syringe/vial injection 4 mg  4 mg    Or    ondansetron (Zofran ODT) dispertab 4 mg  4 mg    diphenhydrAMINE (Benadryl) tablet/capsule 25 mg  25 mg    Or    diphenhydrAMINE (Benadryl) injection 25 mg  25 mg    docusate sodium (Colace) capsule 100 mg  100 mg    scopolamine (Transderm-Scop) patch 1 Patch  1 Patch    levothyroxine (Synthroid) tablet 150 mcg  150 mcg       Recent Labs     07/20/23  0810 07/21/23  0532   WBC 9.5 13.0*   RBC 4.51 3.73*   HEMOGLOBIN 14.8 12.4   HEMATOCRIT 42.1 36.4*   MCV 93.3 97.6   MCH 32.8 33.2*   MCHC 35.2 34.1   RDW 42.5 44.3   PLATELETCT 246 196   MPV 10.4 10.6         Activity/ Discharge  Instructions::   Discharge to home  Pelvic Rest x 6 weeks  No heavy lifting x4 weeks  Call or come to ED for: heavy vaginal bleeding, fever >100.4, severe abdominal pain, severe headache, chest pain, shortness of breath,  N/V, incisional drainage, or other concerns.       Follow up:  [facility] in 1 week for incision check for  delivery.       Lucinda Burger, DO  PGY-1, [facility]

## 2023-07-22 NOTE — CARE PLAN
The patient is Stable - Low risk of patient condition declining or worsening    Shift Goals  Clinical Goals: fundus and lochia WNL, pain management  Patient Goals: pain management, rest  Family Goals: support    Progress made toward(s) clinical / shift goals: Fundus firm, lochia scant. Pain managed with scheduled pain medications per MAR. Pt resting with FOB at bedside offering support.    Problem: Altered Physiologic Condition  Goal: Patient physiologically stable as evidenced by normal lochia, palpable uterine involution and vitals within normal limits  Outcome: Progressing     Problem: Pain - Standard  Goal: Alleviation of pain or a reduction in pain to the patient’s comfort goal  Outcome: Progressing     Patient is not progressing towards the following goals: NA

## 2023-07-22 NOTE — DISCHARGE INSTRUCTIONS

## 2023-07-27 ENCOUNTER — GYNECOLOGY VISIT (OUTPATIENT)
Dept: OBGYN | Facility: CLINIC | Age: 33
End: 2023-07-27
Payer: OTHER GOVERNMENT

## 2023-07-27 VITALS — BODY MASS INDEX: 26.02 KG/M2 | SYSTOLIC BLOOD PRESSURE: 115 MMHG | WEIGHT: 176.2 LBS | DIASTOLIC BLOOD PRESSURE: 81 MMHG

## 2023-07-27 DIAGNOSIS — Z98.890 POST-OPERATIVE STATE: Primary | ICD-10-CM

## 2023-07-27 PROCEDURE — 3079F DIAST BP 80-89 MM HG: CPT | Performed by: OBSTETRICS & GYNECOLOGY

## 2023-07-27 PROCEDURE — 3074F SYST BP LT 130 MM HG: CPT | Performed by: OBSTETRICS & GYNECOLOGY

## 2023-07-27 PROCEDURE — 99024 POSTOP FOLLOW-UP VISIT: CPT | Performed by: OBSTETRICS & GYNECOLOGY

## 2023-07-27 ASSESSMENT — FIBROSIS 4 INDEX: FIB4 SCORE: 0.8

## 2023-07-27 NOTE — PROGRESS NOTES
Incision Check     CC: Incision Check     HPI: 32 y.o.  s/p  delivery on 2023 who presents today for routine incision check  Pt reports doing well.  Denies fevers/chills.  Denies incisional redness/pain or drainage.  Normal lochia.    Is breast feeding     Denies concerns about mood. Reports that she is handing things well at home and not feeling too overwhelmed.      Vitals:    23 1135   BP: 115/81      Gen: AAO, NAD  Abd: soft, NT, ND,   Incision C/D/I, healing well     A/P: 32 y.o.  s/p LTCS  - no signs of postop complications  - encouraged BFing, lactation visit prn  - no signs of PP depression     RTC for routine postpartum at 6wks PP    Stefany Grossman M.D.

## 2023-08-21 ENCOUNTER — HOSPITAL ENCOUNTER (OUTPATIENT)
Dept: LAB | Facility: MEDICAL CENTER | Age: 33
End: 2023-08-21
Attending: INTERNAL MEDICINE
Payer: OTHER GOVERNMENT

## 2023-08-21 DIAGNOSIS — C73 PAPILLARY THYROID CARCINOMA (HCC): ICD-10-CM

## 2023-08-21 DIAGNOSIS — E89.0 POSTOPERATIVE HYPOTHYROIDISM: ICD-10-CM

## 2023-08-21 LAB
T4 FREE SERPL-MCNC: 2.09 NG/DL (ref 0.93–1.7)
TSH SERPL DL<=0.005 MIU/L-ACNC: 0.06 UIU/ML (ref 0.38–5.33)

## 2023-08-21 PROCEDURE — 84443 ASSAY THYROID STIM HORMONE: CPT

## 2023-08-21 PROCEDURE — 84439 ASSAY OF FREE THYROXINE: CPT

## 2023-08-21 PROCEDURE — 36415 COLL VENOUS BLD VENIPUNCTURE: CPT

## 2023-08-25 DIAGNOSIS — E89.0 POSTOPERATIVE HYPOTHYROIDISM: ICD-10-CM

## 2023-08-25 RX ORDER — LEVOTHYROXINE SODIUM 137 UG/1
137 TABLET ORAL
Qty: 90 TABLET | Refills: 1 | Status: SHIPPED | OUTPATIENT
Start: 2023-08-25 | End: 2023-11-29 | Stop reason: SDUPTHER

## 2023-08-29 ENCOUNTER — POST PARTUM (OUTPATIENT)
Dept: OBGYN | Facility: CLINIC | Age: 33
End: 2023-08-29
Payer: OTHER GOVERNMENT

## 2023-08-29 VITALS — SYSTOLIC BLOOD PRESSURE: 125 MMHG | DIASTOLIC BLOOD PRESSURE: 76 MMHG | WEIGHT: 169.2 LBS | BODY MASS INDEX: 24.99 KG/M2

## 2023-08-29 PROCEDURE — 3074F SYST BP LT 130 MM HG: CPT | Performed by: OBSTETRICS & GYNECOLOGY

## 2023-08-29 PROCEDURE — 0503F POSTPARTUM CARE VISIT: CPT | Performed by: OBSTETRICS & GYNECOLOGY

## 2023-08-29 PROCEDURE — 3078F DIAST BP <80 MM HG: CPT | Performed by: OBSTETRICS & GYNECOLOGY

## 2023-08-29 ASSESSMENT — EDINBURGH POSTNATAL DEPRESSION SCALE (EPDS)
I HAVE LOOKED FORWARD WITH ENJOYMENT TO THINGS: AS MUCH AS I EVER DID
TOTAL SCORE: 0
I HAVE FELT SAD OR MISERABLE: NO, NOT AT ALL
I HAVE FELT SCARED OR PANICKY FOR NO GOOD REASON: NO, NOT AT ALL
THINGS HAVE BEEN GETTING ON TOP OF ME: NO, I HAVE BEEN COPING AS WELL AS EVER
I HAVE BEEN ABLE TO LAUGH AND SEE THE FUNNY SIDE OF THINGS: AS MUCH AS I ALWAYS COULD
I HAVE BEEN SO UNHAPPY THAT I HAVE BEEN CRYING: NO, NEVER
I HAVE BLAMED MYSELF UNNECESSARILY WHEN THINGS WENT WRONG: NO, NEVER
THE THOUGHT OF HARMING MYSELF HAS OCCURRED TO ME: NEVER
I HAVE BEEN ANXIOUS OR WORRIED FOR NO GOOD REASON: NO, NOT AT ALL
I HAVE BEEN SO UNHAPPY THAT I HAVE HAD DIFFICULTY SLEEPING: NOT AT ALL

## 2023-08-29 ASSESSMENT — FIBROSIS 4 INDEX: FIB4 SCORE: 0.83

## 2023-11-27 ENCOUNTER — HOSPITAL ENCOUNTER (OUTPATIENT)
Dept: LAB | Facility: MEDICAL CENTER | Age: 33
End: 2023-11-27
Attending: INTERNAL MEDICINE
Payer: OTHER GOVERNMENT

## 2023-11-27 DIAGNOSIS — C73 PAPILLARY THYROID CARCINOMA (HCC): ICD-10-CM

## 2023-11-27 DIAGNOSIS — E55.9 VITAMIN D DEFICIENCY: ICD-10-CM

## 2023-11-27 DIAGNOSIS — E89.0 POSTOPERATIVE HYPOTHYROIDISM: ICD-10-CM

## 2023-11-27 LAB
25(OH)D3 SERPL-MCNC: 41 NG/ML (ref 30–100)
ALBUMIN SERPL BCP-MCNC: 4.8 G/DL (ref 3.2–4.9)
ALBUMIN/GLOB SERPL: 1.8 G/DL
ALP SERPL-CCNC: 81 U/L (ref 30–99)
ALT SERPL-CCNC: 14 U/L (ref 2–50)
ANION GAP SERPL CALC-SCNC: 13 MMOL/L (ref 7–16)
AST SERPL-CCNC: 18 U/L (ref 12–45)
BILIRUB SERPL-MCNC: 1.4 MG/DL (ref 0.1–1.5)
BUN SERPL-MCNC: 16 MG/DL (ref 8–22)
CALCIUM ALBUM COR SERPL-MCNC: 8.7 MG/DL (ref 8.5–10.5)
CALCIUM SERPL-MCNC: 9.3 MG/DL (ref 8.5–10.5)
CHLORIDE SERPL-SCNC: 107 MMOL/L (ref 96–112)
CO2 SERPL-SCNC: 23 MMOL/L (ref 20–33)
CREAT SERPL-MCNC: 0.68 MG/DL (ref 0.5–1.4)
GFR SERPLBLD CREATININE-BSD FMLA CKD-EPI: 118 ML/MIN/1.73 M 2
GLOBULIN SER CALC-MCNC: 2.6 G/DL (ref 1.9–3.5)
GLUCOSE SERPL-MCNC: 90 MG/DL (ref 65–99)
POTASSIUM SERPL-SCNC: 4.3 MMOL/L (ref 3.6–5.5)
PROT SERPL-MCNC: 7.4 G/DL (ref 6–8.2)
SODIUM SERPL-SCNC: 143 MMOL/L (ref 135–145)
T4 FREE SERPL-MCNC: 1.62 NG/DL (ref 0.93–1.7)
TSH SERPL DL<=0.005 MIU/L-ACNC: 0.07 UIU/ML (ref 0.38–5.33)

## 2023-11-27 PROCEDURE — 84432 ASSAY OF THYROGLOBULIN: CPT

## 2023-11-27 PROCEDURE — 36415 COLL VENOUS BLD VENIPUNCTURE: CPT

## 2023-11-27 PROCEDURE — 86800 THYROGLOBULIN ANTIBODY: CPT

## 2023-11-27 PROCEDURE — 82306 VITAMIN D 25 HYDROXY: CPT

## 2023-11-27 PROCEDURE — 84439 ASSAY OF FREE THYROXINE: CPT

## 2023-11-27 PROCEDURE — 84443 ASSAY THYROID STIM HORMONE: CPT

## 2023-11-27 PROCEDURE — 80053 COMPREHEN METABOLIC PANEL: CPT

## 2023-11-29 ENCOUNTER — OFFICE VISIT (OUTPATIENT)
Dept: ENDOCRINOLOGY | Facility: MEDICAL CENTER | Age: 33
End: 2023-11-29
Attending: INTERNAL MEDICINE
Payer: OTHER GOVERNMENT

## 2023-11-29 VITALS
HEART RATE: 89 BPM | BODY MASS INDEX: 24.23 KG/M2 | DIASTOLIC BLOOD PRESSURE: 70 MMHG | HEIGHT: 69 IN | RESPIRATION RATE: 20 BRPM | OXYGEN SATURATION: 97 % | WEIGHT: 163.6 LBS | SYSTOLIC BLOOD PRESSURE: 110 MMHG

## 2023-11-29 DIAGNOSIS — E06.3 HASHIMOTO'S DISEASE: ICD-10-CM

## 2023-11-29 DIAGNOSIS — E89.0 POSTOPERATIVE HYPOTHYROIDISM: ICD-10-CM

## 2023-11-29 DIAGNOSIS — E55.9 VITAMIN D DEFICIENCY: ICD-10-CM

## 2023-11-29 DIAGNOSIS — C73 PAPILLARY THYROID CARCINOMA (HCC): ICD-10-CM

## 2023-11-29 LAB
THYROGLOB AB SERPL-ACNC: 3.7 IU/ML (ref 0–4)
THYROGLOB SERPL-MCNC: 8.3 NG/ML (ref 1.3–31.8)
THYROGLOB SERPL-MCNC: NORMAL NG/ML (ref 1.3–31.8)

## 2023-11-29 PROCEDURE — 99213 OFFICE O/P EST LOW 20 MIN: CPT | Performed by: INTERNAL MEDICINE

## 2023-11-29 PROCEDURE — 99214 OFFICE O/P EST MOD 30 MIN: CPT | Performed by: INTERNAL MEDICINE

## 2023-11-29 PROCEDURE — 3078F DIAST BP <80 MM HG: CPT | Performed by: INTERNAL MEDICINE

## 2023-11-29 PROCEDURE — 3074F SYST BP LT 130 MM HG: CPT | Performed by: INTERNAL MEDICINE

## 2023-11-29 RX ORDER — LEVOTHYROXINE SODIUM 137 UG/1
137 TABLET ORAL
Qty: 90 TABLET | Refills: 1 | Status: SHIPPED | OUTPATIENT
Start: 2023-11-29 | End: 2024-02-20 | Stop reason: SDUPTHER

## 2023-11-29 ASSESSMENT — FIBROSIS 4 INDEX: FIB4 SCORE: 0.81

## 2023-11-29 NOTE — PROGRESS NOTES
CHIEF COMPLAINT: Followup of postsurgical hypothyroidism and papillary thyroid carcinoma.    HPI:   Lina Poon is a 33 y.o. female, who had an initial left hemithyroidectomy with isthmusectomy on January 22, 2021 in [place] with pathology revealing papillary thyroid cancer.  She was diagnosed with a suspicious thyroid nodule on the left lobe during the second trimester pregnancy.  Pathology revealed 3.2 cm classic type and follicular variant of papillary thyroid carcinoma with no extrathyroidal extension and no lymphatic or vascular invasion.  10 out of 10 lymph nodes from the central neck compartment showed no evidence of metastasis.  Because her thyroid cancer was low risk he was not treated with radioactive iodine.  Incidentally she does have Hashimoto's thyroiditis and her TPO antibodies are over 600 at baseline.  She was previously seen by the nurse practitioner.        Her unstimulated quantitative thyroglobulin was 0.8 with negative interfering antibodies on April 29, 2022  Her unstimulated thyroglobulin was 3.3 with negative interfering antibodies on February 2023  Her unstimulated thyroglobulin was 8.3 with negative interfering antibodies on 11/2023      Her last neck ultrasound on may 2023  showed no evidence of recurrence with an intact fairly heterogenous  right lobe with no focal or suspicious thyroid nodules.      Currently she remains on levothyroxine 137 MCG daily which has been her dose since 8/2023.  She reports good compliance and denies missing any daily doses.  She denies feeling lumps in her neck.  She denies problems with swallowing and hoarseness.  She denies palpitations, tremors, and anxiety.       Latest Reference Range & Units 11/27/23 07:44   25-Hydroxy   Vitamin D 25 30 - 100 ng/mL 41   TSH 0.380 - 5.330 uIU/mL 0.070 (L)   Free T-4 0.93 - 1.70 ng/dL 1.62   Thyroglobulin by LC-MC/MS-S/P 1.3 - 31.8 ng/mL Not Applicable   Thyroglobulin 1.3 - 31.8 ng/mL 8.3          Latest  Reference Range & Units 23 07:44   Sodium 135 - 145 mmol/L 143   Potassium 3.6 - 5.5 mmol/L 4.3   Chloride 96 - 112 mmol/L 107   Co2 20 - 33 mmol/L 23   Anion Gap 7.0 - 16.0  13.0   Glucose 65 - 99 mg/dL 90   Bun 8 - 22 mg/dL 16   Creatinine 0.50 - 1.40 mg/dL 0.68   GFR (CKD-EPI) >60 mL/min/1.73 m 2 118   Calcium 8.5 - 10.5 mg/dL 9.3   Correct Calcium 8.5 - 10.5 mg/dL 8.7   AST(SGOT) 12 - 45 U/L 18   ALT(SGPT) 2 - 50 U/L 14   Alkaline Phosphatase 30 - 99 U/L 81   Total Bilirubin 0.1 - 1.5 mg/dL 1.4   Albumin 3.2 - 4.9 g/dL 4.8   Total Protein 6.0 - 8.2 g/dL 7.4   Globulin 1.9 - 3.5 g/dL 2.6   A-G Ratio g/dL 1.8             Patient's medications, allergies, and social histories were reviewed and updated as appropriate.      ROS:      CONS:     No fever, no chills   EYES:     No diplopia, no blurry vision   CV:           No chest pain, no palpitations   PULM:     No SOB, no cough, no hemoptysis.   GI:            No nausea, no vomiting, no diarrhea, no constipation   ENDO:     No polyuria, no polydipsia, no heat intolerance, no cold intolerance       Past Medical History:  Problem List:  2023:  delivery delivered  2023: Indication for care in labor or delivery  2023: Pyelectasis of fetus on prenatal ultrasound  2023: Ventricular septal defect (VSD) of fetus affecting   management of pregnancy  2023: VSD (ventricular septal defect)  2023: Leiomyoma of uterus affecting pregnancy in second trimester  2023: Prenatal care, subsequent pregnancy  2023: History of  delivery  2022: Papillary thyroid carcinoma (HCC)  2022: Postoperative hypothyroidism  2022: Vitamin D deficiency  2022: Hashimoto's disease      Past Surgical History:  Past Surgical History:   Procedure Laterality Date    REPEAT C SECTION N/A 2023    Procedure: REPEAT  SECTION;  Surgeon: Tamiko Owens M.D.;  Location: [facility];  Service: Labor and Delivery    TONSILLECTOMY  2013  "   NO PERTINENT PAST SURGICAL HISTORY  May 10,2021    C section    OTHER  January 2021    Thyroidectomy - left lobe        Allergies:  Patient has no known allergies.     Social History:  Social History     Tobacco Use    Smoking status: Never     Passive exposure: Never    Smokeless tobacco: Never   Vaping Use    Vaping Use: Never used   Substance Use Topics    Alcohol use: Not Currently     Comment: occasional    Drug use: Never        Family History:   family history includes No Known Problems in her father and mother.      PHYSICAL EXAM:   Vital signs: /70 (BP Location: Right arm, Patient Position: Sitting, BP Cuff Size: Adult)   Pulse 89   Resp 20   Ht 1.753 m (5' 9\")   Wt 74.2 kg (163 lb 9.6 oz)   SpO2 97%   Breastfeeding Yes   BMI 24.16 kg/m²   GENERAL: Well-developed, well-nourished in no apparent distress.   EYE:  No ocular asymmetry, PERRLA  HENT: Pink, moist mucous membranes.    NECK: Well healed transverse scar, Thyroid is surgically absent   CARDIOVASCULAR:  No murmurs  LUNGS: Clear breath sounds  ABDOMEN: Soft, nontender   EXTREMITIES: No clubbing, cyanosis, or edema.   NEUROLOGICAL: No gross focal motor abnormalities   LYMPH: No cervical adenopathy palpated.   SKIN: No rashes, lesions.       Labs:  Lab Results   Component Value Date/Time    WBC 13.0 (H) 07/21/2023 05:32 AM    RBC 3.73 (L) 07/21/2023 05:32 AM    HEMOGLOBIN 12.4 07/21/2023 05:32 AM    MCV 97.6 07/21/2023 05:32 AM    MCH 33.2 (H) 07/21/2023 05:32 AM    MCHC 34.1 07/21/2023 05:32 AM    RDW 44.3 07/21/2023 05:32 AM    MPV 10.6 07/21/2023 05:32 AM       Lab Results   Component Value Date/Time    SODIUM 143 11/27/2023 07:44 AM    POTASSIUM 4.3 11/27/2023 07:44 AM    CHLORIDE 107 11/27/2023 07:44 AM    CO2 23 11/27/2023 07:44 AM    ANION 13.0 11/27/2023 07:44 AM    GLUCOSE 90 11/27/2023 07:44 AM    BUN 16 11/27/2023 07:44 AM    CREATININE 0.68 11/27/2023 07:44 AM    CALCIUM 9.3 11/27/2023 07:44 AM    ASTSGOT 18 11/27/2023 07:44 " AM    ALTSGPT 14 11/27/2023 07:44 AM    TBILIRUBIN 1.4 11/27/2023 07:44 AM    ALBUMIN 4.8 11/27/2023 07:44 AM    TOTPROTEIN 7.4 11/27/2023 07:44 AM    GLOBULIN 2.6 11/27/2023 07:44 AM    AGRATIO 1.8 11/27/2023 07:44 AM       Lab Results   Component Value Date/Time    TSHULTRASEN 0.220 (L) 04/29/2022 1343     Lab Results   Component Value Date/Time    FREET4 2.25 (H) 04/29/2022 1343     Lab Results   Component Value Date/Time    FREET3 3.34 04/29/2022 1343     No results found for: THYSTIMIG      Imaging:    POC US THYROID/PARATHYROID/NECK  Narrative: 5/22/2023 7:32 AM    HISTORY/REASON FOR EXAM:  History of thyroid cancer with left lobectomy in 2021    TECHNIQUE/EXAM DESCRIPTION:  Ultrasound of the soft tissues of the head and neck.    COMPARISON:  None    FINDINGS:  The thyroid gland is homogeneous.  Vascularity is normal.    The right lobe of the thyroid gland measures 4.44 cm x 1.25 cm x 1.64 cm cm. No focal mass lesions are identified.  The left lobe of the thyroid gland is surgically absent  The isthmus is surgically absent    Focused ultrasound of the area of concern left neck demonstrates no mass, fluid collection or hematoma.    Survey of the bilateral lateral cervical lymph node chains reveals no suspicious-appearing lymph nodes.  Impression: Homogeneous right lobe with no focal nodules  No suspicious lymph nodes identified in the lateral neck    RECOMMENDATIONS  Repeat ultrasound in 12 months    US report completed and dictated by  Chacorta German MD, FACE ECNU        ASSESSMENT/PLAN:     1. Papillary thyroid carcinoma (HCC)  Stable  No evidence of recurrence  Continue monitoring quantitative thyroglobulin every 6 to 12 months  Continue monitoring neck ultrasound every 12 months      2. Postoperative hypothyroidism  Unstable  TSH is too low   Because she has had an excellent response to thyroid cancer treatment goals of TSH between 0.5 and 2.0  Adjust levothyroxine to 137 mcg 6 days as week and 1/2 pill  once a week  Follow-up in 3 months with repeat thyroid labs  She informed today that she is also moving to [facility].  I will see her again next visit and send a referral for endocrinology in [facility]    3. Vitamin D deficiency  Stable  Recommended she take vitamin D3 5000 IU daily  Continue monitoring levels    4. Hashimoto's disease  This was in the background of her thyroid cancer  Her right lobe that is intact and it shows heterogenous changes compatible with autoimmune thyroid disease or Hashimoto's  Recommend observation as she is already receiving appropriate treatment with thyroid replacement therapy      Return in about 3 months (around 2/29/2024).      Thank you kindly for allowing me to participate in the thyroid care plan for this patient.    Chacorta German MD, FACE, [facility]      CC:   Vincent Painting M.D.

## 2024-01-05 ENCOUNTER — TELEPHONE (OUTPATIENT)
Dept: ENDOCRINOLOGY | Facility: MEDICAL CENTER | Age: 34
End: 2024-01-05
Payer: OTHER GOVERNMENT

## 2024-02-14 ENCOUNTER — HOSPITAL ENCOUNTER (OUTPATIENT)
Dept: LAB | Facility: MEDICAL CENTER | Age: 34
End: 2024-02-14
Attending: INTERNAL MEDICINE
Payer: OTHER GOVERNMENT

## 2024-02-14 DIAGNOSIS — E06.3 HASHIMOTO'S DISEASE: ICD-10-CM

## 2024-02-14 DIAGNOSIS — E89.0 POSTOPERATIVE HYPOTHYROIDISM: ICD-10-CM

## 2024-02-14 DIAGNOSIS — C73 PAPILLARY THYROID CARCINOMA (HCC): ICD-10-CM

## 2024-02-14 DIAGNOSIS — E55.9 VITAMIN D DEFICIENCY: ICD-10-CM

## 2024-02-14 LAB
25(OH)D3 SERPL-MCNC: 37 NG/ML (ref 30–100)
ALBUMIN SERPL BCP-MCNC: 4.7 G/DL (ref 3.2–4.9)
ALBUMIN/GLOB SERPL: 1.5 G/DL
ALP SERPL-CCNC: 82 U/L (ref 30–99)
ALT SERPL-CCNC: 10 U/L (ref 2–50)
ANION GAP SERPL CALC-SCNC: 13 MMOL/L (ref 7–16)
AST SERPL-CCNC: 16 U/L (ref 12–45)
BILIRUB SERPL-MCNC: 1.4 MG/DL (ref 0.1–1.5)
BUN SERPL-MCNC: 19 MG/DL (ref 8–22)
CALCIUM ALBUM COR SERPL-MCNC: 8.9 MG/DL (ref 8.5–10.5)
CALCIUM SERPL-MCNC: 9.5 MG/DL (ref 8.5–10.5)
CHLORIDE SERPL-SCNC: 101 MMOL/L (ref 96–112)
CO2 SERPL-SCNC: 25 MMOL/L (ref 20–33)
CREAT SERPL-MCNC: 0.77 MG/DL (ref 0.5–1.4)
GFR SERPLBLD CREATININE-BSD FMLA CKD-EPI: 104 ML/MIN/1.73 M 2
GLOBULIN SER CALC-MCNC: 3.1 G/DL (ref 1.9–3.5)
GLUCOSE SERPL-MCNC: 90 MG/DL (ref 65–99)
POTASSIUM SERPL-SCNC: 4.3 MMOL/L (ref 3.6–5.5)
PROT SERPL-MCNC: 7.8 G/DL (ref 6–8.2)
SODIUM SERPL-SCNC: 139 MMOL/L (ref 135–145)
T4 FREE SERPL-MCNC: 1.52 NG/DL (ref 0.93–1.7)
TSH SERPL DL<=0.005 MIU/L-ACNC: 1.45 UIU/ML (ref 0.38–5.33)

## 2024-02-14 PROCEDURE — 80053 COMPREHEN METABOLIC PANEL: CPT

## 2024-02-14 PROCEDURE — 36415 COLL VENOUS BLD VENIPUNCTURE: CPT

## 2024-02-14 PROCEDURE — 84439 ASSAY OF FREE THYROXINE: CPT

## 2024-02-14 PROCEDURE — 84443 ASSAY THYROID STIM HORMONE: CPT

## 2024-02-14 PROCEDURE — 82306 VITAMIN D 25 HYDROXY: CPT

## 2024-02-20 ENCOUNTER — TELEMEDICINE (OUTPATIENT)
Dept: ENDOCRINOLOGY | Facility: MEDICAL CENTER | Age: 34
End: 2024-02-20
Attending: INTERNAL MEDICINE
Payer: OTHER GOVERNMENT

## 2024-02-20 DIAGNOSIS — C73 PAPILLARY THYROID CARCINOMA (HCC): ICD-10-CM

## 2024-02-20 DIAGNOSIS — E55.9 VITAMIN D DEFICIENCY: ICD-10-CM

## 2024-02-20 DIAGNOSIS — E89.0 POSTOPERATIVE HYPOTHYROIDISM: ICD-10-CM

## 2024-02-20 DIAGNOSIS — E06.3 HASHIMOTO'S DISEASE: ICD-10-CM

## 2024-02-20 PROCEDURE — 99214 OFFICE O/P EST MOD 30 MIN: CPT | Performed by: INTERNAL MEDICINE

## 2024-02-20 RX ORDER — LEVOTHYROXINE SODIUM 137 UG/1
137 TABLET ORAL
Qty: 90 TABLET | Refills: 2 | Status: SHIPPED | OUTPATIENT
Start: 2024-02-20

## 2024-02-20 NOTE — PROGRESS NOTES
CHIEF COMPLAINT: Followup of postsurgical hypothyroidism and papillary thyroid carcinoma.   Patient was presented for a telehealth consultation via secure and encrypted videoconferencing technology.   This encounter was conducted via Zoom . Verbal consent was obtained. Patient's identity was verified.    Virtual visit consent       This evaluation was conducted via Zoom using secure and encrypted videoconferencing technology.   The patient was (home or other private:[id number]) in the [facility].   The patient's identity was confirmed and verbal consent was obtained for this virtual visit.         HPI:   Lina Poon is a 33 y.o. female, who had an initial left hemithyroidectomy with isthmusectomy on January 22, 2021 in [place] with pathology revealing papillary thyroid cancer.  She was diagnosed with a suspicious thyroid nodule on the left lobe during the second trimester pregnancy.  Pathology revealed 3.2 cm classic type and follicular variant of papillary thyroid carcinoma with no extrathyroidal extension and no lymphatic or vascular invasion.  10 out of 10 lymph nodes from the central neck compartment showed no evidence of metastasis.  Because her thyroid cancer was low risk he was not treated with radioactive iodine.  Incidentally she does have Hashimoto's thyroiditis and her TPO antibodies are over 600 at baseline.  She was previously seen by the nurse practitioner.        Her unstimulated quantitative thyroglobulin was 0.8 with negative interfering antibodies on April 29, 2022  Her unstimulated thyroglobulin was 3.3 with negative interfering antibodies on February 2023  Her unstimulated thyroglobulin was 8.3 with negative interfering antibodies on 11/2023      Her last neck ultrasound on may 2023  showed no evidence of recurrence with an intact fairly heterogenous  right lobe with no focal or suspicious thyroid nodules.      Currently she remains on levothyroxine 137 MCG daily except 1/2 on sundau  which has been her dose since 2023.  She reports good compliance and denies missing any daily doses.  She denies feeling lumps in her neck.  She denies problems with swallowing and hoarseness.  She denies palpitations, tremors, and anxiety.       Latest Reference Range & Units 24 06:04   25-Hydroxy   Vitamin D 25 30 - 100 ng/mL 37   TSH 0.380 - 5.330 uIU/mL 1.450   Free T-4 0.93 - 1.70 ng/dL 1.52        Latest Reference Range & Units 24 06:04   Sodium 135 - 145 mmol/L 139   Potassium 3.6 - 5.5 mmol/L 4.3   Chloride 96 - 112 mmol/L 101   Co2 20 - 33 mmol/L 25   Anion Gap 7.0 - 16.0  13.0   Glucose 65 - 99 mg/dL 90   Bun 8 - 22 mg/dL 19   Creatinine 0.50 - 1.40 mg/dL 0.77   GFR (CKD-EPI) >60 mL/min/1.73 m 2 104   Calcium 8.5 - 10.5 mg/dL 9.5   Correct Calcium 8.5 - 10.5 mg/dL 8.9           Patient's medications, allergies, and social histories were reviewed and updated as appropriate.      ROS:      CONS:     No fever, no chills   EYES:     No diplopia, no blurry vision   CV:           No chest pain, no palpitations   PULM:     No SOB, no cough, no hemoptysis.   GI:            No nausea, no vomiting, no diarrhea, no constipation   ENDO:     No polyuria, no polydipsia, no heat intolerance, no cold intolerance       Past Medical History:  Problem List:  2023:  delivery delivered  2023: Indication for care in labor or delivery  2023: Pyelectasis of fetus on prenatal ultrasound  2023: Ventricular septal defect (VSD) of fetus affecting   management of pregnancy  2023: VSD (ventricular septal defect)  2023: Leiomyoma of uterus affecting pregnancy in second trimester  2023: Prenatal care, subsequent pregnancy  2023: History of  delivery  2022: Papillary thyroid carcinoma (HCC)  2022: Postoperative hypothyroidism  2022: Vitamin D deficiency  2022: Hashimoto's disease      Past Surgical History:  Past Surgical History:   Procedure Laterality Date    REPEAT C  SECTION N/A 2023    Procedure: REPEAT  SECTION;  Surgeon: Tamiko Owens M.D.;  Location: [facility]ERY LAB[facility] DEL[facility];  Service: Labor and Delivery    TONSILLECTOMY  2013    NO PERTINENT PAST SURGICAL HISTORY  May 10,2021    C section    OTHER  2021    Thyroidectomy - left lobe        Allergies:  Patient has no known allergies.     Social History:  Social History     Tobacco Use    Smoking status: Never     Passive exposure: Never    Smokeless tobacco: Never   Vaping Use    Vaping Use: Never used   Substance Use Topics    Alcohol use: Not Currently     Comment: occasional    Drug use: Never        Family History:   family history includes No Known Problems in her father and mother.      PHYSICAL EXAM:   Vital signs: There were no vitals taken for this visit.  GENERAL: Well-developed, well-nourished in no apparent distress.   EYE:  No ocular asymmetry, PERRLA  HENT: Pink, moist mucous membranes.    NECK: Well healed transverse scar, Thyroid is surgically absent   CARDIOVASCULAR:  No murmurs  LUNGS: Clear breath sounds  ABDOMEN: Soft, nontender   EXTREMITIES: No clubbing, cyanosis, or edema.   NEUROLOGICAL: No gross focal motor abnormalities   LYMPH: No cervical adenopathy seen  SKIN: No rashes, lesions.       Labs:  Lab Results   Component Value Date/Time    WBC 13.0 (H) 2023 05:32 AM    RBC 3.73 (L) 2023 05:32 AM    HEMOGLOBIN 12.4 2023 05:32 AM    MCV 97.6 2023 05:32 AM    MCH 33.2 (H) 2023 05:32 AM    MCHC 34.1 2023 05:32 AM    RDW 44.3 2023 05:32 AM    MPV 10.6 2023 05:32 AM       Lab Results   Component Value Date/Time    SODIUM 139 2024 06:04 AM    POTASSIUM 4.3 2024 06:04 AM    CHLORIDE 101 2024 06:04 AM    CO2 25 2024 06:04 AM    ANION 13.0 2024 06:04 AM    GLUCOSE 90 2024 06:04 AM    BUN 19 2024 06:04 AM    CREATININE 0.77 2024 06:04 AM    CALCIUM 9.5 2024 06:04 AM    ASTSGOT 16 2024  06:04 AM    ALTSGPT 10 02/14/2024 06:04 AM    TBILIRUBIN 1.4 02/14/2024 06:04 AM    ALBUMIN 4.7 02/14/2024 06:04 AM    TOTPROTEIN 7.8 02/14/2024 06:04 AM    GLOBULIN 3.1 02/14/2024 06:04 AM    AGRATIO 1.5 02/14/2024 06:04 AM       Lab Results   Component Value Date/Time    TSHULTRASEN 0.220 (L) 04/29/2022 1343     Lab Results   Component Value Date/Time    FREET4 2.25 (H) 04/29/2022 1343     Lab Results   Component Value Date/Time    FREET3 3.34 04/29/2022 1343     No results found for: THYSTIMIG      Imaging:    POC US THYROID/PARATHYROID/NECK  Narrative: 5/22/2023 7:32 AM    HISTORY/REASON FOR EXAM:  History of thyroid cancer with left lobectomy in 2021    TECHNIQUE/EXAM DESCRIPTION:  Ultrasound of the soft tissues of the head and neck.    COMPARISON:  None    FINDINGS:  The thyroid gland is homogeneous.  Vascularity is normal.    The right lobe of the thyroid gland measures 4.44 cm x 1.25 cm x 1.64 cm cm. No focal mass lesions are identified.  The left lobe of the thyroid gland is surgically absent  The isthmus is surgically absent    Focused ultrasound of the area of concern left neck demonstrates no mass, fluid collection or hematoma.    Survey of the bilateral lateral cervical lymph node chains reveals no suspicious-appearing lymph nodes.  Impression: Homogeneous right lobe with no focal nodules  No suspicious lymph nodes identified in the lateral neck    RECOMMENDATIONS  Repeat ultrasound in 12 months    US report completed and dictated by  Chacorta German MD, FACE ECNU        ASSESSMENT/PLAN:     1. Papillary thyroid carcinoma (HCC)  Stable  No evidence of recurrence  Continue monitoring quantitative thyroglobulin every 6 to 12 months  Continue monitoring neck ultrasound every 12 months      2. Postoperative hypothyroidism  Stable   Because she has had an excellent response to thyroid cancer treatment goals of TSH between 0.5 and 2.0  TSH is meeting this goal  Continue  levothyroxine 137 mcg 6 days as week  and 1/2 pill once a week  She informed today that she is also moving to [facility].  I will  send a referral for endocrinology in [facility] once she is ready    3. Vitamin D deficiency  Stable  Recommended she take vitamin D3 5000 IU daily  Continue monitoring levels    4. Hashimoto's disease  This was in the background of her thyroid cancer  Her right lobe that is intact and it shows heterogenous changes compatible with autoimmune thyroid disease or Hashimoto's  Recommend observation as she is already receiving appropriate treatment with thyroid replacement therapy      Return if symptoms worsen or fail to improve.      Thank you kindly for allowing me to participate in the thyroid care plan for this patient.    Chacorta German MD, FACE, [facility]      CC:   Vincent Painting M.D.

## (undated) DEVICE — PACK C-SECTION (2EA/CA)

## (undated) DEVICE — ELECTRODE RADIOLUCENT LF 3PK - RED DOT (3/PK 200PK/CA)

## (undated) DEVICE — SLEEVE, SEQUENTIAL CALF REG

## (undated) DEVICE — RETRACTOR O C SECTION LRY - (5/BX)

## (undated) DEVICE — BLANKET STERILE CHICKIE FOR L&D (100/CA)

## (undated) DEVICE — WATER IRRIGATION STERILE 1000ML (12EA/CA)

## (undated) DEVICE — SUTUREABS02-0 CT1 27IN - (36EA/BX)

## (undated) DEVICE — CATHETER IV NON-SAFETY 18 GA X 1 1/4 (50/BX 4BX/CA)

## (undated) DEVICE — PAD GROUNDING PRE-JELLED - (50EA/PK)

## (undated) DEVICE — SUTURE 4-0 VICRYL PLUS SH - UNDYED 27 INCH (36PK/BX)

## (undated) DEVICE — SUTURE 2-0 VICRYL PLUS CT-1 36 (36PK/BX)"

## (undated) DEVICE — RETRACTOR O C SECTION X-LRY - (5/BX)

## (undated) DEVICE — KIT  I.V. START (100EA/CA)

## (undated) DEVICE — CHLORAPREP 26 ML APPLICATOR - ORANGE TINT(25/CA)

## (undated) DEVICE — PACK ROOM TURNOVER L&D (12/CA)

## (undated) DEVICE — GLOVE BIOGEL INDICATOR SZ 6.5 SURGICAL PF LTX - (50PR/BX 4BX/CA)

## (undated) DEVICE — WRAP PROBE OXIMETER INFANT UNIVERSAL DESIGN TO FIT NEONATAL FOOT INFANT TOE OR PED FINGER  (12EA/BX)

## (undated) DEVICE — SODIUM CHL IRRIGATION 0.9% 1000ML (12EA/CA)

## (undated) DEVICE — SOLUTION PLASMA-LYTE PH 7.4 INJ 1000ML  (14EA/CA)

## (undated) DEVICE — PLUMEPEN ULTRA 3/8 IN X 10 FT HOSE (20EA/CA)

## (undated) DEVICE — BAG SPONGE COUNT 10.25 X 32 - BLUE (250/CA)

## (undated) DEVICE — TELFA 8 X 10 BIOSEAL - (50EA/CA)

## (undated) DEVICE — CANNULA O2 COMFORT SOFT EAR ADULT 7 FT TUBING (50/CA)

## (undated) DEVICE — BLANKET UNDERBODY FULL ACCES - (5/CA)

## (undated) DEVICE — SPONGE GAUZESTER 4 X 4 4PLY - (128PK/CA)

## (undated) DEVICE — SET EXTENSION WITH 2 PORTS (48EA/CA) ***PART #2C8610 IS A SUBSTITUTE*****

## (undated) DEVICE — GLOVE BIOGEL SZ 6.5 SURGICAL PF LTX (50PR/BX 4BX/CA)

## (undated) DEVICE — TRAY FOLEY CATHETER STATLOCK 16FR SURESTEP  (10EA/CA)

## (undated) DEVICE — HEAD HOLDER JUNIOR/ADULT

## (undated) DEVICE — TRAY SPINAL ANESTHESIA NON-SAFETY (10/CA)

## (undated) DEVICE — CANISTER SUCTION 3000ML MECHANICAL FILTER AUTO SHUTOFF MEDI-VAC NONSTERILE LF DISP  (40EA/CA)

## (undated) DEVICE — ADHESIVE DERMABOND HVD MINI (12EA/BX)

## (undated) DEVICE — SUCTION INSTRUMENT YANKAUER BULBOUS TIP W/O VENT (50EA/CA)

## (undated) DEVICE — SENSOR SPO2 NEO LNCS ADHESIVE (20/BX) SEE USER NOTES

## (undated) DEVICE — SENSOR SPO2 ADULT LNCS ADTX (20/BX) ORDER ITEM #19593

## (undated) DEVICE — CHLORAPREP 3 ML APPLICATOR - (25/BX 4BX/CS 100/CS)

## (undated) DEVICE — TUBE CONNECT SUCTION CLEAR 120 X 1/4" (50EA/CA)"